# Patient Record
Sex: FEMALE | Race: ASIAN | NOT HISPANIC OR LATINO | Employment: STUDENT | URBAN - METROPOLITAN AREA
[De-identification: names, ages, dates, MRNs, and addresses within clinical notes are randomized per-mention and may not be internally consistent; named-entity substitution may affect disease eponyms.]

---

## 2017-03-03 ENCOUNTER — OFFICE VISIT (OUTPATIENT)
Dept: URGENT CARE | Facility: CLINIC | Age: 22
End: 2017-03-03
Payer: OTHER GOVERNMENT

## 2017-03-03 VITALS
WEIGHT: 190 LBS | DIASTOLIC BLOOD PRESSURE: 74 MMHG | SYSTOLIC BLOOD PRESSURE: 122 MMHG | HEIGHT: 68 IN | HEART RATE: 88 BPM | BODY MASS INDEX: 28.79 KG/M2 | TEMPERATURE: 99.9 F | RESPIRATION RATE: 16 BRPM | OXYGEN SATURATION: 94 %

## 2017-03-03 DIAGNOSIS — J06.9 ACUTE URI: ICD-10-CM

## 2017-03-03 DIAGNOSIS — J45.21 MILD INTERMITTENT ASTHMA WITH ACUTE EXACERBATION: ICD-10-CM

## 2017-03-03 PROCEDURE — 99214 OFFICE O/P EST MOD 30 MIN: CPT | Performed by: NURSE PRACTITIONER

## 2017-03-03 RX ORDER — AZITHROMYCIN 250 MG/1
TABLET, FILM COATED ORAL
Qty: 6 TAB | Refills: 0 | Status: SHIPPED | OUTPATIENT
Start: 2017-03-03 | End: 2017-09-28

## 2017-03-03 RX ORDER — PREDNISONE 20 MG/1
40 TABLET ORAL DAILY
Qty: 8 TAB | Refills: 0 | Status: SHIPPED | OUTPATIENT
Start: 2017-03-03 | End: 2017-09-08

## 2017-03-03 RX ORDER — CODEINE PHOSPHATE AND GUAIFENESIN 10; 100 MG/5ML; MG/5ML
5-10 SOLUTION ORAL EVERY 6 HOURS PRN
Qty: 120 ML | Refills: 0 | Status: ON HOLD | OUTPATIENT
Start: 2017-03-03 | End: 2017-09-28

## 2017-03-03 ASSESSMENT — ENCOUNTER SYMPTOMS
CHILLS: 0
SPUTUM PRODUCTION: 0
SHORTNESS OF BREATH: 0
WEAKNESS: 1
RHINORRHEA: 1
SORE THROAT: 1
WHEEZING: 0
COUGH: 1
NAUSEA: 0
FEVER: 0
MYALGIAS: 0
VOMITING: 0
DIARRHEA: 0

## 2017-03-03 NOTE — MR AVS SNAPSHOT
"        Nia Caballero   3/3/2017 3:45 PM   Office Visit   MRN: [id number]    Department:  [facility]   Dept Phone:  [phone]    Description:  Female : 1995   Provider:  CLAUDIA Bae           Reason for Visit     Cough 6 days      Allergies as of 3/3/2017     Allergen Noted Reactions    Naproxen 2014         You were diagnosed with     Acute URI   [641753]       Mild intermittent asthma with acute exacerbation   [697733]         Vital Signs     Blood Pressure Pulse Temperature Respirations Height Weight    122/74 mmHg 88 37.7 °C (99.9 °F) 16 1.734 m (5' 8.27\") 86.183 kg (190 lb)    Body Mass Index Oxygen Saturation Smoking Status             28.66 kg/m2 94% Never Smoker          Basic Information     Date Of Birth Sex Race Ethnicity Preferred Language    1995 Female White Non- English      Problem List              ICD-10-CM Priority Class Noted - Resolved    Asthma J45.909   2015 - Present    Asthma exacerbation J45.901   2015 - Present      Health Maintenance        Date Due Completion Dates    IMM HEP B VACCINE (1 of 3 - Primary Series) 1995 ---    IMM HEP A VACCINE (1 of 2 - Standard Series) 1/3/1996 ---    IMM VARICELLA (CHICKENPOX) VACCINE (1 of 2 - 2 Dose Adolescent Series) 1/3/2008 ---    IMM PNEUMOCOCCAL 19-64 (ADULT) MEDIUM RISK SERIES (1 of 1 - PPSV23) 1/3/2014 ---    IMM HPV VACCINE (2 of 3 - Female 3 Dose Series) 3/26/2015 2015    PAP SMEAR 1/3/2016 ---    IMM INFLUENZA (1) 2016 ---    IMM DTaP/Tdap/Td Vaccine (2 - Td) 2025            Current Immunizations     HPV Quadrivalent Vaccine (GARDASIL) 2015    Tdap Vaccine 2015      Below and/or attached are the medications your provider expects you to take. Review all of your home medications and newly ordered medications with your provider and/or pharmacist. Follow medication instructions as directed by your provider and/or pharmacist. Please keep your " medication list with you and share with your provider. Update the information when medications are discontinued, doses are changed, or new medications (including over-the-counter products) are added; and carry medication information at all times in the event of emergency situations     Allergies:  NAPROXEN - (reactions not documented)               Medications  Valid as of: March 03, 2017 -  4:53 PM    Generic Name Brand Name Tablet Size Instructions for use    albuterol (PROVENTIL) 2.5 mg/0.5 mL NEBU (Nebu Soln) PROVENTIL 2.5 mg/0.5 mL 10 mg by Nebulization route 4 times a day as needed.        Albuterol Sulfate (Aero Soln) albuterol 108 (90 BASE) MCG/ACT Inhale 2 Puffs by mouth every 6 hours as needed for Shortness of Breath.        Albuterol Sulfate (Aero Soln) VENTOLIN  (90 BASE) MCG/ACT INHALE TWO PUFFS BY MOUTH EVERY 6 HOURS AS NEEDED FOR FOR SHORTNESS OF BREATH        Azithromycin (Tab) ZITHROMAX 250 MG Take as directed        Cetirizine HCl (Tab) ZYRTEC 10 MG Take 1 Tab by mouth every day.        Fluticasone Propionate (Suspension) FLONASE 50 MCG/ACT Spray 1 Spray in nose every day.        Fluticasone Propionate HFA (Aerosol) FLOVENT  MCG/ACT Inhale 2 Puffs by mouth every 12 hours.        Fluticasone-Salmeterol (AEROSOL POWDER, BREATH ACTIVATED) ADVAIR 500-50 MCG/DOSE Inhale 1 Puff by mouth every 12 hours.        Guaifenesin-Codeine (Solution) ROBITUSSIN -10 mg/5mL Take 5-10 mL by mouth every 6 hours as needed for Cough.        Montelukast Sodium (Tab) SINGULAIR 10 MG Take 1 Tab by mouth every day.        PredniSONE (Tab) DELTASONE 20 MG Take 2 Tabs by mouth every day.        .                 Medicines prescribed today were sent to:     [facility][id number] - [place] - [address] [place] [address]    Phone: [phone] Fax: [phone]    Open 24 Hours?: No    [id number] - [place] - [address] AT [facility]    [address]  [place] [address]    Phone: [phone] Fax: [phone]    Open 24 Hours?: Yes      Medication refill instructions:       If your prescription bottle indicates you have medication refills left, it is not necessary to call your provider’s office. Please contact your pharmacy and they will refill your medication.    If your prescription bottle indicates you do not have any refills left, you may request refills at any time through one of the following ways: The online Belly system (except Urgent Care), by calling your provider’s office, or by asking your pharmacy to contact your provider’s office with a refill request. Medication refills are processed only during regular business hours and may not be available until the next business day. Your provider may request additional information or to have a follow-up visit with you prior to refilling your medication.   *Please Note: Medication refills are assigned a new Rx number when refilled electronically. Your pharmacy may indicate that no refills were authorized even though a new prescription for the same medication is available at the pharmacy. Please request the medicine by name with the pharmacy before contacting your provider for a refill.           Belly Access Code: Activation code not generated  Current Belly Status: Active

## 2017-03-03 NOTE — PROGRESS NOTES
"Subjective:      Nia Caballero is a 22 y.o. female who presents with Cough            HPI Comments: Medications, Allergies and Prior Medical Hx reviewed and updated in [facility].with patient/family today         Cough  This is a new problem. The current episode started in the past 7 days. The problem has been gradually worsening. The cough is productive of sputum. Associated symptoms include nasal congestion, rhinorrhea and a sore throat. Pertinent negatives include no chills, ear congestion, ear pain, fever, myalgias, shortness of breath or wheezing. Nothing aggravates the symptoms. She has tried a beta-agonist inhaler for the symptoms. The treatment provided moderate relief. Her past medical history is significant for asthma.       Review of Systems   Constitutional: Positive for malaise/fatigue. Negative for fever and chills.   HENT: Positive for congestion, rhinorrhea and sore throat. Negative for ear discharge and ear pain.    Respiratory: Positive for cough. Negative for sputum production, shortness of breath and wheezing.    Gastrointestinal: Negative for nausea, vomiting and diarrhea.   Musculoskeletal: Negative for myalgias.   Neurological: Positive for weakness.          Objective:     /74 mmHg  Pulse 88  Temp(Src) 37.7 °C (99.9 °F)  Resp 16  Ht 1.734 m (5' 8.27\")  Wt 86.183 kg (190 lb)  BMI 28.66 kg/m2  SpO2 94%     Physical Exam   Constitutional: She appears well-developed and well-nourished. No distress.   HENT:   Head: Normocephalic and atraumatic.   Right Ear: Tympanic membrane and ear canal normal.   Left Ear: Tympanic membrane and ear canal normal.   Nose: Rhinorrhea present.   Mouth/Throat: Uvula is midline and mucous membranes are normal. Posterior oropharyngeal edema and posterior oropharyngeal erythema present. No oropharyngeal exudate.   Eyes: Conjunctivae are normal. Pupils are equal, round, and reactive to light.   Neck: Neck supple.   Cardiovascular: Normal rate, regular rhythm " and normal heart sounds.    Pulmonary/Chest: Effort normal and breath sounds normal. No respiratory distress. She has no wheezes.   Lymphadenopathy:     She has cervical adenopathy.   Neurological: She is alert.   Skin: Skin is warm and dry.   Psychiatric: She has a normal mood and affect. Her behavior is normal.   Vitals reviewed.              Assessment/Plan:       1. Acute URI  azithromycin (ZITHROMAX) 250 MG Tab    predniSONE (DELTASONE) 20 MG Tab    guaifenesin-codeine (CHERATUSSIN AC) Solution oral solution   2. Mild intermittent asthma with acute exacerbation  azithromycin (ZITHROMAX) 250 MG Tab    predniSONE (DELTASONE) 20 MG Tab    guaifenesin-codeine (CHERATUSSIN AC) Solution oral solution       Do not drink alcohol or operate machinery with this medication  Pt reviewed on [place]  Aware,  no remarkable controlled substance prescription documentation noted    Rest, Fluids, tylenol, ibuprofen, humidified air, and otc cough meds  Pt will go to the ER for worsening or changing symptoms as discussed, follow-up with your primary care provider or return here if not improving in 7 days   Discharge instructions discussed with pt/family who verbalize understanding and agreement with poc

## 2017-05-13 ENCOUNTER — OFFICE VISIT (OUTPATIENT)
Dept: URGENT CARE | Facility: CLINIC | Age: 22
End: 2017-05-13
Payer: OTHER GOVERNMENT

## 2017-05-13 VITALS
BODY MASS INDEX: 31.23 KG/M2 | DIASTOLIC BLOOD PRESSURE: 76 MMHG | RESPIRATION RATE: 20 BRPM | OXYGEN SATURATION: 93 % | TEMPERATURE: 97.7 F | WEIGHT: 207 LBS | SYSTOLIC BLOOD PRESSURE: 110 MMHG | HEART RATE: 93 BPM

## 2017-05-13 DIAGNOSIS — Z91.09 ENVIRONMENTAL ALLERGIES: ICD-10-CM

## 2017-05-13 DIAGNOSIS — R05.9 COUGH: ICD-10-CM

## 2017-05-13 PROCEDURE — 99214 OFFICE O/P EST MOD 30 MIN: CPT | Mod: 25 | Performed by: PHYSICIAN ASSISTANT

## 2017-05-13 RX ORDER — AZITHROMYCIN 250 MG/1
TABLET, FILM COATED ORAL
Qty: 6 TAB | Refills: 1 | Status: SHIPPED | OUTPATIENT
Start: 2017-05-13 | End: 2017-09-28

## 2017-05-13 RX ORDER — TRIAMCINOLONE ACETONIDE 40 MG/ML
40 INJECTION, SUSPENSION INTRA-ARTICULAR; INTRAMUSCULAR ONCE
Status: COMPLETED | OUTPATIENT
Start: 2017-05-13 | End: 2017-05-13

## 2017-05-13 RX ORDER — METHYLPREDNISOLONE 4 MG/1
TABLET ORAL
Qty: 1 TAB | Refills: 0 | Status: SHIPPED | OUTPATIENT
Start: 2017-05-13 | End: 2017-09-08

## 2017-05-13 RX ADMIN — TRIAMCINOLONE ACETONIDE 40 MG: 40 INJECTION, SUSPENSION INTRA-ARTICULAR; INTRAMUSCULAR at 13:48

## 2017-05-13 ASSESSMENT — ENCOUNTER SYMPTOMS
SORE THROAT: 0
EYES NEGATIVE: 1
FEVER: 0
CONSTITUTIONAL NEGATIVE: 1
SHORTNESS OF BREATH: 0
SPUTUM PRODUCTION: 0
COUGH: 1

## 2017-05-13 NOTE — MR AVS SNAPSHOT
Nia Caballero   2017 1:15 PM   Office Visit   MRN: [id number]    Department:  [facility]   Dept Phone:  [phone]    Description:  Female : 1995   Provider:  Pierre Pradhan PA-C           Reason for Visit     Allergic Rhinitis x1 wk, stuffy nose, itchy and watery eyes    Cough x 1 wk, dry nonproductive cough, wheezing and shortness of breath      Allergies as of 2017     Allergen Noted Reactions    Naproxen 2014         You were diagnosed with     Environmental allergies   [256943]       Cough   [786.2.ICD-9-CM]         Vital Signs     Blood Pressure Pulse Temperature Respirations Weight Oxygen Saturation    110/76 mmHg 93 36.5 °C (97.7 °F) 20 93.895 kg (207 lb) 93%    Smoking Status                   Never Smoker            Basic Information     Date Of Birth Sex Race Ethnicity Preferred Language    1995 Female White Non- English      Problem List              ICD-10-CM Priority Class Noted - Resolved    Asthma J45.909   2015 - Present    Asthma exacerbation J45.901   2015 - Present      Health Maintenance        Date Due Completion Dates    IMM HEP B VACCINE (1 of 3 - Primary Series) 1995 ---    IMM HEP A VACCINE (1 of 2 - Standard Series) 1/3/1996 ---    IMM VARICELLA (CHICKENPOX) VACCINE (1 of 2 - 2 Dose Adolescent Series) 1/3/2008 ---    IMM PNEUMOCOCCAL 19-64 (ADULT) MEDIUM RISK SERIES (1 of 1 - PPSV23) 1/3/2014 ---    IMM HPV VACCINE (2 of 3 - Female 3 Dose Series) 3/26/2015 2015    PAP SMEAR 1/3/2016 ---    IMM DTaP/Tdap/Td Vaccine (2 - Td) 2025            Current Immunizations     HPV Quadrivalent Vaccine (GARDASIL) 2015    Tdap Vaccine 2015      Below and/or attached are the medications your provider expects you to take. Review all of your home medications and newly ordered medications with your provider and/or pharmacist. Follow medication instructions as directed by your provider and/or pharmacist. Please  keep your medication list with you and share with your provider. Update the information when medications are discontinued, doses are changed, or new medications (including over-the-counter products) are added; and carry medication information at all times in the event of emergency situations     Allergies:  NAPROXEN - (reactions not documented)               Medications  Valid as of: May 13, 2017 -  2:04 PM    Generic Name Brand Name Tablet Size Instructions for use    albuterol (PROVENTIL) 2.5 mg/0.5 mL NEBU (Nebu Soln) PROVENTIL 2.5 mg/0.5 mL 10 mg by Nebulization route 4 times a day as needed.        Albuterol Sulfate (Aero Soln) albuterol 108 (90 BASE) MCG/ACT Inhale 2 Puffs by mouth every 6 hours as needed for Shortness of Breath.        Albuterol Sulfate (Aero Soln) VENTOLIN  (90 BASE) MCG/ACT INHALE TWO PUFFS BY MOUTH EVERY 6 HOURS AS NEEDED FOR FOR SHORTNESS OF BREATH        Azithromycin (Tab) ZITHROMAX 250 MG Take as directed        Azithromycin (Tab) ZITHROMAX 250 MG z-emily; U.D.        Cetirizine HCl (Tab) ZYRTEC 10 MG Take 1 Tab by mouth every day.        Fluticasone Propionate (Suspension) FLONASE 50 MCG/ACT Spray 1 Spray in nose every day.        Fluticasone Propionate HFA (Aerosol) FLOVENT  MCG/ACT Inhale 2 Puffs by mouth every 12 hours.        Fluticasone-Salmeterol (AEROSOL POWDER, BREATH ACTIVATED) ADVAIR 500-50 MCG/DOSE Inhale 1 Puff by mouth every 12 hours.        Guaifenesin-Codeine (Solution) ROBITUSSIN -10 mg/5mL Take 5-10 mL by mouth every 6 hours as needed for Cough.        MethylPREDNISolone (Tablet Therapy Pack) MEDROL DOSEPAK 4 MG U.D.        Montelukast Sodium (Tab) SINGULAIR 10 MG Take 1 Tab by mouth every day.        PredniSONE (Tab) DELTASONE 20 MG Take 2 Tabs by mouth every day.        .                 Medicines prescribed today were sent to:     [id number] [place] - [address] AT [facility]    [address]  [place] [address]    Phone: [phone] Fax: [phone]    Open 24 Hours?: No      Medication refill instructions:       If your prescription bottle indicates you have medication refills left, it is not necessary to call your provider’s office. Please contact your pharmacy and they will refill your medication.    If your prescription bottle indicates you do not have any refills left, you may request refills at any time through one of the following ways: The online PerBlue system (except Urgent Care), by calling your provider’s office, or by asking your pharmacy to contact your provider’s office with a refill request. Medication refills are processed only during regular business hours and may not be available until the next business day. Your provider may request additional information or to have a follow-up visit with you prior to refilling your medication.   *Please Note: Medication refills are assigned a new Rx number when refilled electronically. Your pharmacy may indicate that no refills were authorized even though a new prescription for the same medication is available at the pharmacy. Please request the medicine by name with the pharmacy before contacting your provider for a refill.           PerBlue Access Code: Activation code not generated  Current PerBlue Status: Active

## 2017-05-13 NOTE — PROGRESS NOTES
Subjective:      Nia Caballero is a 22 y.o. female who presents with Allergic Rhinitis and Cough            Cough  This is a new (allerg.sx, cough) problem. The current episode started in the past 7 days. The problem has been unchanged. The problem occurs every few minutes. The cough is non-productive. Associated symptoms include nasal congestion. Pertinent negatives include no fever, sore throat or shortness of breath. Nothing aggravates the symptoms. She has tried nothing for the symptoms. The treatment provided no relief. There is no history of asthma or environmental allergies.       Review of Systems   Constitutional: Negative.  Negative for fever.   HENT: Positive for congestion. Negative for sore throat.    Eyes: Negative.    Respiratory: Positive for cough. Negative for sputum production and shortness of breath.    Skin: Negative.    Endo/Heme/Allergies: Negative for environmental allergies.          Objective:     /76 mmHg  Pulse 93  Temp(Src) 36.5 °C (97.7 °F)  Resp 20  Wt 93.895 kg (207 lb)  SpO2 93%     Physical Exam   Constitutional: She is oriented to person, place, and time. She appears well-developed and well-nourished. No distress.   HENT:   Head: Normocephalic and atraumatic.   Eyes: EOM are normal. Pupils are equal, round, and reactive to light.   Neck: Normal range of motion. Neck supple.   Cardiovascular: Normal rate.    Pulmonary/Chest: Effort normal and breath sounds normal. No respiratory distress. She has no wheezes. She has no rales.   Lymphadenopathy:     She has no cervical adenopathy.   Neurological: She is alert and oriented to person, place, and time.   Skin: Skin is warm and dry.   Psychiatric: She has a normal mood and affect. Her behavior is normal. Judgment and thought content normal.   Nursing note and vitals reviewed.    Filed Vitals:    05/13/17 1326   BP: 110/76   Pulse: 93   Temp: 36.5 °C (97.7 °F)   Resp: 20   Weight: 93.895 kg (207 lb)   SpO2: 93%     Active  Ambulatory Problems     Diagnosis Date Noted   • Asthma 01/29/2015   • Asthma exacerbation 04/02/2015     Resolved Ambulatory Problems     Diagnosis Date Noted   • No Resolved Ambulatory Problems     Past Medical History   Diagnosis Date   • Unspecified asthma    • Seasonal allergies      Current Outpatient Prescriptions on File Prior to Visit   Medication Sig Dispense Refill   • montelukast (SINGULAIR) 10 MG TABS Take 1 Tab by mouth every day. 30 Tab 1   • fluticasone-salmeterol (ADVAIR) 500-50 MCG/DOSE AEPB Inhale 1 Puff by mouth every 12 hours. 1 Inhaler 3   • fluticasone (FLONASE) 50 MCG/ACT nasal spray Spray 1 Spray in nose every day.     • fluticasone (FLOVENT HFA) 110 MCG/ACT AERO Inhale 2 Puffs by mouth every 12 hours. 2 Inhaler 1   • albuterol (PROVENTIL) 2.5 mg/0.5 mL NEBU 10 mg by Nebulization route 4 times a day as needed. 3 Bottle 3   • azithromycin (ZITHROMAX) 250 MG Tab Take as directed 6 Tab 0   • predniSONE (DELTASONE) 20 MG Tab Take 2 Tabs by mouth every day. 8 Tab 0   • guaifenesin-codeine (CHERATUSSIN AC) Solution oral solution Take 5-10 mL by mouth every 6 hours as needed for Cough. 120 mL 0   • VENTOLIN  (90 BASE) MCG/ACT Aero Soln inhalation aerosol INHALE TWO PUFFS BY MOUTH EVERY 6 HOURS AS NEEDED FOR FOR SHORTNESS OF BREATH 1 Inhaler 0   • cetirizine (ZYRTEC) 10 MG TABS Take 1 Tab by mouth every day. 30 Tab 0   • albuterol (VENTOLIN OR PROVENTIL) 108 (90 BASE) MCG/ACT AERS inhalation aerosol Inhale 2 Puffs by mouth every 6 hours as needed for Shortness of Breath. 8.5 g 3     No current facility-administered medications on file prior to visit.     Gargles, Cepacol lozenges, Aleve/Advil as needed for throat pain  Family History   Problem Relation Age of Onset   • Alcohol/Drug Neg Hx    • Allergies Neg Hx    • Anesthesia Neg Hx    • Cancer Maternal Grandmother    • Diabetes Mother    • Diabetes Maternal Grandmother    • Heart Disease Neg Hx    • Hypertension Mother    • Hypertension  Father    • Hyperlipidemia Father    • Psychiatry Neg Hx    • Stroke Neg Hx    • Thyroid Neg Hx      Naproxen              Assessment/Plan:     ·  allergies; cough      · Kenalog 40mgIM; [add medrolDP prn or zpak if cough/sx persist]

## 2017-07-15 ENCOUNTER — OFFICE VISIT (OUTPATIENT)
Dept: URGENT CARE | Facility: CLINIC | Age: 22
End: 2017-07-15
Payer: OTHER GOVERNMENT

## 2017-07-15 VITALS
HEART RATE: 83 BPM | DIASTOLIC BLOOD PRESSURE: 72 MMHG | HEIGHT: 68 IN | BODY MASS INDEX: 31.07 KG/M2 | OXYGEN SATURATION: 95 % | TEMPERATURE: 98.4 F | SYSTOLIC BLOOD PRESSURE: 108 MMHG | WEIGHT: 205 LBS

## 2017-07-15 DIAGNOSIS — H65.113 ACUTE MUCOID OTITIS MEDIA OF BOTH EARS: ICD-10-CM

## 2017-07-15 PROCEDURE — 99213 OFFICE O/P EST LOW 20 MIN: CPT | Performed by: PHYSICIAN ASSISTANT

## 2017-07-15 RX ORDER — CEFUROXIME AXETIL 500 MG/1
500 TABLET ORAL 2 TIMES DAILY
Qty: 20 TAB | Refills: 0 | Status: SHIPPED | OUTPATIENT
Start: 2017-07-15 | End: 2017-07-25

## 2017-07-15 RX ORDER — PREDNISONE 20 MG/1
40 TABLET ORAL DAILY
Qty: 6 TAB | Refills: 0 | Status: SHIPPED | OUTPATIENT
Start: 2017-07-15 | End: 2017-07-18

## 2017-07-15 ASSESSMENT — ENCOUNTER SYMPTOMS
HEADACHES: 0
CONSTITUTIONAL NEGATIVE: 1
SORE THROAT: 0
NEUROLOGICAL NEGATIVE: 1

## 2017-07-15 NOTE — PROGRESS NOTES
"Subjective:      Nia Caballero is a 22 y.o. female who presents with Otalgia            Otalgia   There is pain in both ears. This is a new (ears plugged, pain) problem. The current episode started in the past 7 days. The problem occurs constantly. The problem has been unchanged. There has been no fever. The pain is mild. Associated symptoms include hearing loss. Pertinent negatives include no ear discharge, headaches or sore throat. She has tried nothing for the symptoms. The treatment provided no relief. There is no history of a chronic ear infection, hearing loss or a tympanostomy tube.       Review of Systems   Constitutional: Negative.    HENT: Positive for ear pain and hearing loss. Negative for ear discharge, sore throat and tinnitus.    Skin: Negative.    Neurological: Negative.  Negative for headaches.          Objective:     /72 mmHg  Pulse 83  Temp(Src) 36.9 °C (98.4 °F)  Ht 1.727 m (5' 7.99\")  Wt 92.987 kg (205 lb)  BMI 31.18 kg/m2  SpO2 95%     Physical Exam   Constitutional: She is oriented to person, place, and time. She appears well-developed and well-nourished. No distress.   HENT:   Head: Normocephalic and atraumatic.   +TM redn bilat     Neck: Normal range of motion. Neck supple.   Neurological: She is alert and oriented to person, place, and time.   Skin: Skin is warm and dry.   Psychiatric: She has a normal mood and affect. Her behavior is normal. Judgment and thought content normal.   Nursing note and vitals reviewed.    Filed Vitals:    07/15/17 1608   BP: 108/72   Pulse: 83   Temp: 36.9 °C (98.4 °F)   Height: 1.727 m (5' 7.99\")   Weight: 92.987 kg (205 lb)   SpO2: 95%     Active Ambulatory Problems     Diagnosis Date Noted   • Asthma 01/29/2015   • Asthma exacerbation 04/02/2015     Resolved Ambulatory Problems     Diagnosis Date Noted   • No Resolved Ambulatory Problems     Past Medical History   Diagnosis Date   • Unspecified asthma    • Seasonal allergies      Current " Outpatient Prescriptions on File Prior to Visit   Medication Sig Dispense Refill   • VENTOLIN  (90 BASE) MCG/ACT Aero Soln inhalation aerosol INHALE TWO PUFFS BY MOUTH EVERY 6 HOURS AS NEEDED FOR FOR SHORTNESS OF BREATH 1 Inhaler 0   • montelukast (SINGULAIR) 10 MG TABS Take 1 Tab by mouth every day. 30 Tab 1   • fluticasone (FLONASE) 50 MCG/ACT nasal spray Spray 1 Spray in nose every day.     • cetirizine (ZYRTEC) 10 MG TABS Take 1 Tab by mouth every day. 30 Tab 0   • albuterol (PROVENTIL) 2.5 mg/0.5 mL NEBU 10 mg by Nebulization route 4 times a day as needed. 3 Bottle 3   • MethylPREDNISolone (MEDROL DOSEPAK) 4 MG Tablet Therapy Pack U.D. 1 Tab 0   • azithromycin (ZITHROMAX) 250 MG Tab z-emily; U.D. 6 Tab 1   • azithromycin (ZITHROMAX) 250 MG Tab Take as directed 6 Tab 0   • predniSONE (DELTASONE) 20 MG Tab Take 2 Tabs by mouth every day. 8 Tab 0   • guaifenesin-codeine (CHERATUSSIN AC) Solution oral solution Take 5-10 mL by mouth every 6 hours as needed for Cough. 120 mL 0   • fluticasone-salmeterol (ADVAIR) 500-50 MCG/DOSE AEPB Inhale 1 Puff by mouth every 12 hours. 1 Inhaler 3   • fluticasone (FLOVENT HFA) 110 MCG/ACT AERO Inhale 2 Puffs by mouth every 12 hours. 2 Inhaler 1   • albuterol (VENTOLIN OR PROVENTIL) 108 (90 BASE) MCG/ACT AERS inhalation aerosol Inhale 2 Puffs by mouth every 6 hours as needed for Shortness of Breath. 8.5 g 3     No current facility-administered medications on file prior to visit.     Gargles, Cepacol lozenges, Aleve/Advil as needed for throat pain  Family History   Problem Relation Age of Onset   • Alcohol/Drug Neg Hx    • Allergies Neg Hx    • Anesthesia Neg Hx    • Cancer Maternal Grandmother    • Diabetes Mother    • Diabetes Maternal Grandmother    • Heart Disease Neg Hx    • Hypertension Mother    • Hypertension Father    • Hyperlipidemia Father    • Psychiatry Neg Hx    • Stroke Neg Hx    • Thyroid Neg Hx      Naproxen              Assessment/Plan:     ·  wax  obstr      · meds as rx; cortisone rx [hold prn if sx persist] [pt cant take nsaids]

## 2017-07-15 NOTE — MR AVS SNAPSHOT
"        Nia Caballero   7/15/2017 4:00 PM   Office Visit   MRN: [id number]    Department:  [facility]   Dept Phone:  [phone]    Description:  Female : 1995   Provider:  Pierre Pradhan PA-C           Reason for Visit     Otalgia X today, ears hurt, hard to hear, feels clogged       Allergies as of 7/15/2017     Allergen Noted Reactions    Naproxen 2014         You were diagnosed with     Acute mucoid otitis media of both ears   [[id number]]         Vital Signs     Blood Pressure Pulse Temperature Height Weight Body Mass Index    108/72 mmHg 83 36.9 °C (98.4 °F) 1.727 m (5' 7.99\") 92.987 kg (205 lb) 31.18 kg/m2    Oxygen Saturation Smoking Status                95% Never Smoker           Basic Information     Date Of Birth Sex Race Ethnicity Preferred Language    1995 Female White Non- English      Problem List              ICD-10-CM Priority Class Noted - Resolved    Asthma J45.909   2015 - Present    Asthma exacerbation J45.901   2015 - Present      Health Maintenance        Date Due Completion Dates    IMM HEP B VACCINE (1 of 3 - Primary Series) 1995 ---    IMM HEP A VACCINE (1 of 2 - Standard Series) 1/3/1996 ---    IMM VARICELLA (CHICKENPOX) VACCINE (1 of 2 - 2 Dose Adolescent Series) 1/3/2008 ---    IMM PNEUMOCOCCAL 19-64 (ADULT) MEDIUM RISK SERIES (1 of 1 - PPSV23) 1/3/2014 ---    IMM HPV VACCINE (2 of 3 - Female 3 Dose Series) 3/26/2015 2015    PAP SMEAR 1/3/2016 ---    IMM INFLUENZA (1) 2017 ---    IMM DTaP/Tdap/Td Vaccine (2 - Td) 2025            Current Immunizations     HPV Quadrivalent Vaccine (GARDASIL) 2015    Tdap Vaccine 2015      Below and/or attached are the medications your provider expects you to take. Review all of your home medications and newly ordered medications with your provider and/or pharmacist. Follow medication instructions as directed by your provider and/or pharmacist. Please keep your medication " list with you and share with your provider. Update the information when medications are discontinued, doses are changed, or new medications (including over-the-counter products) are added; and carry medication information at all times in the event of emergency situations     Allergies:  NAPROXEN - (reactions not documented)               Medications  Valid as of: July 15, 2017 -  4:31 PM    Generic Name Brand Name Tablet Size Instructions for use    albuterol (PROVENTIL) 2.5 mg/0.5 mL NEBU (Nebu Soln) PROVENTIL 2.5 mg/0.5 mL 10 mg by Nebulization route 4 times a day as needed.        Albuterol Sulfate (Aero Soln) albuterol 108 (90 BASE) MCG/ACT Inhale 2 Puffs by mouth every 6 hours as needed for Shortness of Breath.        Albuterol Sulfate (Aero Soln) VENTOLIN  (90 BASE) MCG/ACT INHALE TWO PUFFS BY MOUTH EVERY 6 HOURS AS NEEDED FOR FOR SHORTNESS OF BREATH        Azithromycin (Tab) ZITHROMAX 250 MG Take as directed        Azithromycin (Tab) ZITHROMAX 250 MG z-emliy; U.D.        Cefuroxime Axetil (Tab) CEFTIN 500 MG Take 1 Tab by mouth 2 times a day for 10 days.        Cetirizine HCl (Tab) ZYRTEC 10 MG Take 1 Tab by mouth every day.        Fluticasone Propionate (Suspension) FLONASE 50 MCG/ACT Spray 1 Spray in nose every day.        Fluticasone Propionate HFA (Aerosol) FLOVENT  MCG/ACT Inhale 2 Puffs by mouth every 12 hours.        Fluticasone-Salmeterol (AEROSOL POWDER, BREATH ACTIVATED) ADVAIR 500-50 MCG/DOSE Inhale 1 Puff by mouth every 12 hours.        Guaifenesin-Codeine (Solution) ROBITUSSIN -10 mg/5mL Take 5-10 mL by mouth every 6 hours as needed for Cough.        MethylPREDNISolone (Tablet Therapy Pack) MEDROL DOSEPAK 4 MG U.D.        Montelukast Sodium (Tab) SINGULAIR 10 MG Take 1 Tab by mouth every day.        PredniSONE (Tab) DELTASONE 20 MG Take 2 Tabs by mouth every day.        PredniSONE (Tab) DELTASONE 20 MG Take 2 Tabs by mouth every day for 3 days.        .                 Medicines  prescribed today were sent to:     [id number] - [place], [place] - [address] AT [facility] [address]    [address] [place] [address]    Phone: [phone] Fax: [phone]    Open 24 Hours?: No      Medication refill instructions:       If your prescription bottle indicates you have medication refills left, it is not necessary to call your provider’s office. Please contact your pharmacy and they will refill your medication.    If your prescription bottle indicates you do not have any refills left, you may request refills at any time through one of the following ways: The online [employer] system (except Urgent Care), by calling your provider’s office, or by asking your pharmacy to contact your provider’s office with a refill request. Medication refills are processed only during regular business hours and may not be available until the next business day. Your provider may request additional information or to have a follow-up visit with you prior to refilling your medication.   *Please Note: Medication refills are assigned a new Rx number when refilled electronically. Your pharmacy may indicate that no refills were authorized even though a new prescription for the same medication is available at the pharmacy. Please request the medicine by name with the pharmacy before contacting your provider for a refill.           [employer] Access Code: Activation code not generated  Current [employer] Status: Active

## 2017-09-08 ENCOUNTER — OFFICE VISIT (OUTPATIENT)
Dept: URGENT CARE | Facility: CLINIC | Age: 22
End: 2017-09-08
Payer: OTHER GOVERNMENT

## 2017-09-08 VITALS
BODY MASS INDEX: 32.71 KG/M2 | RESPIRATION RATE: 18 BRPM | TEMPERATURE: 97.8 F | DIASTOLIC BLOOD PRESSURE: 76 MMHG | HEART RATE: 88 BPM | HEIGHT: 68 IN | SYSTOLIC BLOOD PRESSURE: 120 MMHG | OXYGEN SATURATION: 94 % | WEIGHT: 215.8 LBS

## 2017-09-08 DIAGNOSIS — J45.20 MILD INTERMITTENT ASTHMA WITHOUT COMPLICATION: ICD-10-CM

## 2017-09-08 PROCEDURE — 99214 OFFICE O/P EST MOD 30 MIN: CPT | Performed by: PHYSICIAN ASSISTANT

## 2017-09-08 RX ORDER — PREDNISONE 20 MG/1
20 TABLET ORAL DAILY
Qty: 5 TAB | Refills: 0 | Status: SHIPPED | OUTPATIENT
Start: 2017-09-08 | End: 2017-09-13

## 2017-09-08 ASSESSMENT — ENCOUNTER SYMPTOMS
MUSCULOSKELETAL NEGATIVE: 1
FEVER: 0
ABDOMINAL PAIN: 0
HEADACHES: 0
WHEEZING: 1
SHORTNESS OF BREATH: 0
VOMITING: 0
CHILLS: 0
DIZZINESS: 0
NAUSEA: 0
DIARRHEA: 0
SPUTUM PRODUCTION: 0
COUGH: 1

## 2017-09-08 ASSESSMENT — COPD QUESTIONNAIRES: COPD: 0

## 2017-09-08 NOTE — PROGRESS NOTES
"Subjective:      Nia Caballero is a 22 y.o. female who presents with Asthma (x3days, asthma acting up at night only, unable to sleep, SOB)            Asthma   She complains of cough and wheezing. There is no shortness of breath or sputum production. This is a recurrent problem. The current episode started in the past 7 days (3 days). Episode frequency: nightly. The problem has been unchanged. The cough is dry. Pertinent negatives include no chest pain, fever or headaches. Her symptoms are aggravated by lying down. Her symptoms are alleviated by change in position and beta-agonist. She reports minimal improvement on treatment. There are no known risk factors for lung disease. Her past medical history is significant for asthma. There is no history of bronchitis, COPD or pneumonia.     Patient presents to urgent care reporting 3 day history of \"asthma flare\". She states she often has this problem when the seasons are changing. She feels fine throughout the day, but as soon as she lay down she starts wheezing and experiencing a dry cough, which is interfering with her sleep. She denies productive cough, fevers, chills, chest pain, SOB, or history of pneumonia.    Review of Systems   Constitutional: Negative for chills and fever.   Respiratory: Positive for cough and wheezing. Negative for sputum production and shortness of breath.    Cardiovascular: Negative for chest pain.   Gastrointestinal: Negative for abdominal pain, diarrhea, nausea and vomiting.   Genitourinary: Negative.    Musculoskeletal: Negative.    Neurological: Negative for dizziness and headaches.          Objective:     /76   Pulse 88   Temp 36.6 °C (97.8 °F)   Resp 18   Ht 1.727 m (5' 8\")   Wt 97.9 kg (215 lb 12.8 oz)   SpO2 94%   BMI 32.81 kg/m²      Physical Exam   Constitutional: She is oriented to person, place, and time. She appears well-developed and well-nourished. No distress.   HENT:   Head: Normocephalic and atraumatic. "   Eyes: Conjunctivae are normal. Pupils are equal, round, and reactive to light.   Neck: Normal range of motion.   Cardiovascular: Normal rate, regular rhythm and normal heart sounds.    No murmur heard.  Pulmonary/Chest: Effort normal and breath sounds normal. No respiratory distress. She has no wheezes. She has no rales.   Mild expiratory wheezes heard throughout lung fields   Musculoskeletal: Normal range of motion.   Neurological: She is alert and oriented to person, place, and time.   Skin: Skin is warm and dry. She is not diaphoretic.   Psychiatric: She has a normal mood and affect. Her behavior is normal.   Nursing note and vitals reviewed.              PMH:  has a past medical history of Seasonal allergies and Unspecified asthma.  MEDS:   Current Outpatient Prescriptions:   •  predniSONE (DELTASONE) 20 MG Tab, Take 1 Tab by mouth every day for 5 days., Disp: 5 Tab, Rfl: 0  •  montelukast (SINGULAIR) 10 MG TABS, Take 1 Tab by mouth every day., Disp: 30 Tab, Rfl: 1  •  fluticasone (FLONASE) 50 MCG/ACT nasal spray, Spray 1 Spray in nose every day., Disp: , Rfl:   •  fluticasone (FLOVENT HFA) 110 MCG/ACT AERO, Inhale 2 Puffs by mouth every 12 hours., Disp: 2 Inhaler, Rfl: 1  •  cetirizine (ZYRTEC) 10 MG TABS, Take 1 Tab by mouth every day., Disp: 30 Tab, Rfl: 0  •  albuterol (PROVENTIL) 2.5 mg/0.5 mL NEBU, 10 mg by Nebulization route 4 times a day as needed., Disp: 3 Bottle, Rfl: 3  •  azithromycin (ZITHROMAX) 250 MG Tab, z-emily; U.D., Disp: 6 Tab, Rfl: 1  •  azithromycin (ZITHROMAX) 250 MG Tab, Take as directed, Disp: 6 Tab, Rfl: 0  •  guaifenesin-codeine (CHERATUSSIN AC) Solution oral solution, Take 5-10 mL by mouth every 6 hours as needed for Cough., Disp: 120 mL, Rfl: 0  •  VENTOLIN  (90 BASE) MCG/ACT Aero Soln inhalation aerosol, INHALE TWO PUFFS BY MOUTH EVERY 6 HOURS AS NEEDED FOR FOR SHORTNESS OF BREATH, Disp: 1 Inhaler, Rfl: 0  •  fluticasone-salmeterol (ADVAIR) 500-50 MCG/DOSE AEPB, Inhale 1 Puff  by mouth every 12 hours., Disp: 1 Inhaler, Rfl: 3  •  albuterol (VENTOLIN OR PROVENTIL) 108 (90 BASE) MCG/ACT AERS inhalation aerosol, Inhale 2 Puffs by mouth every 6 hours as needed for Shortness of Breath., Disp: 8.5 g, Rfl: 3  ALLERGIES:   Allergies   Allergen Reactions   • Naproxen      SURGHX:   Past Surgical History:   Procedure Laterality Date   • MOLE EXCISION      Birth betsy removal     SOCHX:  reports that she has never smoked. She has never used smokeless tobacco. She reports that she does not drink alcohol or use drugs.  FH: family history includes Cancer in her maternal grandmother; Diabetes in her maternal grandmother and mother; Hyperlipidemia in her father; Hypertension in her father and mother.    Assessment/Plan:     1. Mild intermittent asthma without complication  - predniSONE (DELTASONE) 20 MG Tab; Take 1 Tab by mouth every day for 5 days.  Dispense: 5 Tab; Refill: 0    Continue current asthma regimen. Call or return to office if symptoms persist or worsen after treatment with oral steroids. The patient demonstrated a good understanding and agreed with the treatment plan.

## 2017-09-13 ENCOUNTER — OFFICE VISIT (OUTPATIENT)
Dept: URGENT CARE | Facility: CLINIC | Age: 22
End: 2017-09-13
Payer: OTHER GOVERNMENT

## 2017-09-13 VITALS
HEIGHT: 68 IN | BODY MASS INDEX: 33.49 KG/M2 | TEMPERATURE: 98.6 F | OXYGEN SATURATION: 92 % | RESPIRATION RATE: 24 BRPM | DIASTOLIC BLOOD PRESSURE: 92 MMHG | WEIGHT: 221 LBS | HEART RATE: 120 BPM | SYSTOLIC BLOOD PRESSURE: 120 MMHG

## 2017-09-13 DIAGNOSIS — J45.30 MILD PERSISTENT ASTHMA WITHOUT COMPLICATION: ICD-10-CM

## 2017-09-13 PROCEDURE — 94640 AIRWAY INHALATION TREATMENT: CPT | Performed by: PHYSICIAN ASSISTANT

## 2017-09-13 PROCEDURE — 99214 OFFICE O/P EST MOD 30 MIN: CPT | Mod: 25 | Performed by: PHYSICIAN ASSISTANT

## 2017-09-13 RX ORDER — IPRATROPIUM BROMIDE AND ALBUTEROL SULFATE 2.5; .5 MG/3ML; MG/3ML
3 SOLUTION RESPIRATORY (INHALATION) ONCE
Status: COMPLETED | OUTPATIENT
Start: 2017-09-13 | End: 2017-09-13

## 2017-09-13 RX ORDER — METHYLPREDNISOLONE SODIUM SUCCINATE 125 MG/2ML
125 INJECTION, POWDER, LYOPHILIZED, FOR SOLUTION INTRAMUSCULAR; INTRAVENOUS ONCE
Status: COMPLETED | OUTPATIENT
Start: 2017-09-13 | End: 2017-09-13

## 2017-09-13 RX ORDER — ALBUTEROL SULFATE 2.5 MG/3ML
2.5 SOLUTION RESPIRATORY (INHALATION) EVERY 4 HOURS PRN
Qty: 75 ML | Refills: 1 | Status: ON HOLD | OUTPATIENT
Start: 2017-09-13 | End: 2017-09-28

## 2017-09-13 RX ORDER — PREDNISONE 20 MG/1
20 TABLET ORAL DAILY
Qty: 5 TAB | Refills: 0 | Status: SHIPPED | OUTPATIENT
Start: 2017-09-13 | End: 2017-09-18

## 2017-09-13 RX ADMIN — IPRATROPIUM BROMIDE AND ALBUTEROL SULFATE 3 ML: 2.5; .5 SOLUTION RESPIRATORY (INHALATION) at 08:29

## 2017-09-13 RX ADMIN — METHYLPREDNISOLONE SODIUM SUCCINATE 125 MG: 125 INJECTION, POWDER, LYOPHILIZED, FOR SOLUTION INTRAMUSCULAR; INTRAVENOUS at 08:30

## 2017-09-13 ASSESSMENT — ENCOUNTER SYMPTOMS
SHORTNESS OF BREATH: 1
VOMITING: 1
CONSTITUTIONAL NEGATIVE: 1
SORE THROAT: 0
COUGH: 0
SPUTUM PRODUCTION: 0
WHEEZING: 1

## 2017-09-13 NOTE — PROGRESS NOTES
"Subjective:      Nia Caballero is a 22 y.o. female who presents with No chief complaint on file.            Wheezing    This is a new problem. The current episode started in the past 7 days. The problem occurs intermittently. The problem has been waxing and waning. Associated symptoms include shortness of breath and vomiting. Pertinent negatives include no coughing, sore throat or sputum production. Nothing aggravates the symptoms. She has tried nothing for the symptoms. The treatment provided no relief. Her past medical history is significant for asthma.       Review of Systems   Constitutional: Negative.    HENT: Negative for sore throat.    Respiratory: Positive for shortness of breath and wheezing. Negative for cough and sputum production.    Gastrointestinal: Positive for vomiting.   Skin: Negative.           Objective:     There were no vitals taken for this visit.     Physical Exam   Constitutional: She is oriented to person, place, and time. She appears well-developed and well-nourished. No distress.   HENT:   Mouth/Throat: Oropharynx is clear and moist.   Eyes: EOM are normal. Pupils are equal, round, and reactive to light.   Neck: Normal range of motion. Neck supple.   Cardiovascular: Normal rate.    Pulmonary/Chest: Effort normal. No respiratory distress. She has wheezes. She has no rales.   Neurological: She is alert and oriented to person, place, and time.   Skin: Skin is warm and dry.   Psychiatric: She has a normal mood and affect. Her behavior is normal. Judgment and thought content normal.   Nursing note and vitals reviewed.    Vitals:    09/13/17 0816   BP: 120/92   Pulse: (!) 120   Resp: (!) 24   Temp: 37 °C (98.6 °F)   SpO2: 92%   Weight: 100.2 kg (221 lb)   Height: 1.727 m (5' 8\")     Active Ambulatory Problems     Diagnosis Date Noted   • Asthma 01/29/2015   • Asthma exacerbation 04/02/2015     Resolved Ambulatory Problems     Diagnosis Date Noted   • No Resolved Ambulatory Problems "     Past Medical History:   Diagnosis Date   • Seasonal allergies    • Unspecified asthma      Current Outpatient Prescriptions on File Prior to Visit   Medication Sig Dispense Refill   • VENTOLIN  (90 BASE) MCG/ACT Aero Soln inhalation aerosol INHALE TWO PUFFS BY MOUTH EVERY 6 HOURS AS NEEDED FOR FOR SHORTNESS OF BREATH 1 Inhaler 0   • montelukast (SINGULAIR) 10 MG TABS Take 1 Tab by mouth every day. 30 Tab 1   • fluticasone (FLONASE) 50 MCG/ACT nasal spray Spray 1 Spray in nose every day.     • fluticasone (FLOVENT HFA) 110 MCG/ACT AERO Inhale 2 Puffs by mouth every 12 hours. 2 Inhaler 1   • cetirizine (ZYRTEC) 10 MG TABS Take 1 Tab by mouth every day. 30 Tab 0   • albuterol (PROVENTIL) 2.5 mg/0.5 mL NEBU 10 mg by Nebulization route 4 times a day as needed. 3 Bottle 3   • predniSONE (DELTASONE) 20 MG Tab Take 1 Tab by mouth every day for 5 days. 5 Tab 0   • azithromycin (ZITHROMAX) 250 MG Tab z-emily; U.D. 6 Tab 1   • azithromycin (ZITHROMAX) 250 MG Tab Take as directed 6 Tab 0   • guaifenesin-codeine (CHERATUSSIN AC) Solution oral solution Take 5-10 mL by mouth every 6 hours as needed for Cough. 120 mL 0   • fluticasone-salmeterol (ADVAIR) 500-50 MCG/DOSE AEPB Inhale 1 Puff by mouth every 12 hours. 1 Inhaler 3   • albuterol (VENTOLIN OR PROVENTIL) 108 (90 BASE) MCG/ACT AERS inhalation aerosol Inhale 2 Puffs by mouth every 6 hours as needed for Shortness of Breath. 8.5 g 3     No current facility-administered medications on file prior to visit.      Gargles, Cepacol lozenges, Aleve/Advil as needed for throat pain  Family History   Problem Relation Age of Onset   • Diabetes Mother    • Hypertension Mother    • Hypertension Father    • Hyperlipidemia Father    • Cancer Maternal Grandmother    • Diabetes Maternal Grandmother    • Alcohol/Drug Neg Hx    • Allergies Neg Hx    • Anesthesia Neg Hx    • Heart Disease Neg Hx    • Psychiatry Neg Hx    • Stroke Neg Hx    • Thyroid Neg Hx      Naproxen               Assessment/Plan:     ·  asthma/whz      · rx meds; solumedrol 125mgIM

## 2017-09-28 ENCOUNTER — APPOINTMENT (OUTPATIENT)
Dept: RADIOLOGY | Facility: MEDICAL CENTER | Age: 22
DRG: 202 | End: 2017-09-28
Attending: EMERGENCY MEDICINE
Payer: OTHER GOVERNMENT

## 2017-09-28 ENCOUNTER — HOSPITAL ENCOUNTER (INPATIENT)
Facility: MEDICAL CENTER | Age: 22
LOS: 2 days | DRG: 202 | End: 2017-09-30
Attending: EMERGENCY MEDICINE | Admitting: INTERNAL MEDICINE
Payer: OTHER GOVERNMENT

## 2017-09-28 ENCOUNTER — RESOLUTE PROFESSIONAL BILLING HOSPITAL PROF FEE (OUTPATIENT)
Dept: HOSPITALIST | Facility: MEDICAL CENTER | Age: 22
End: 2017-09-28
Payer: OTHER GOVERNMENT

## 2017-09-28 DIAGNOSIS — J45.41 MODERATE PERSISTENT ASTHMA WITH ACUTE EXACERBATION: ICD-10-CM

## 2017-09-28 PROBLEM — J96.01 ACUTE RESPIRATORY FAILURE WITH HYPOXIA (HCC): Status: ACTIVE | Noted: 2017-09-28

## 2017-09-28 LAB
ANION GAP SERPL CALC-SCNC: 10 MMOL/L (ref 0–11.9)
BUN SERPL-MCNC: 10 MG/DL (ref 8–22)
CALCIUM SERPL-MCNC: 9.2 MG/DL (ref 8.5–10.5)
CHLORIDE SERPL-SCNC: 107 MMOL/L (ref 96–112)
CO2 SERPL-SCNC: 22 MMOL/L (ref 20–33)
CREAT SERPL-MCNC: 0.78 MG/DL (ref 0.5–1.4)
FLUAV H1 2009 PAND RNA SPEC QL NAA+PROBE: NOT DETECTED
FLUAV RNA SPEC QL NAA+PROBE: NEGATIVE
FLUBV RNA SPEC QL NAA+PROBE: NEGATIVE
GFR SERPL CREATININE-BSD FRML MDRD: >60 ML/MIN/1.73 M 2
GLUCOSE SERPL-MCNC: 129 MG/DL (ref 65–99)
MAGNESIUM SERPL-MCNC: 2.1 MG/DL (ref 1.5–2.5)
POTASSIUM SERPL-SCNC: 3.6 MMOL/L (ref 3.6–5.5)
PROCALCITONIN SERPL-MCNC: <0.05 NG/ML
SODIUM SERPL-SCNC: 139 MMOL/L (ref 135–145)

## 2017-09-28 PROCEDURE — 90686 IIV4 VACC NO PRSV 0.5 ML IM: CPT | Performed by: INTERNAL MEDICINE

## 2017-09-28 PROCEDURE — 700101 HCHG RX REV CODE 250

## 2017-09-28 PROCEDURE — 3E0234Z INTRODUCTION OF SERUM, TOXOID AND VACCINE INTO MUSCLE, PERCUTANEOUS APPROACH: ICD-10-PCS | Performed by: INTERNAL MEDICINE

## 2017-09-28 PROCEDURE — 96375 TX/PRO/DX INJ NEW DRUG ADDON: CPT

## 2017-09-28 PROCEDURE — 99223 1ST HOSP IP/OBS HIGH 75: CPT | Performed by: INTERNAL MEDICINE

## 2017-09-28 PROCEDURE — 96365 THER/PROPH/DIAG IV INF INIT: CPT

## 2017-09-28 PROCEDURE — 83735 ASSAY OF MAGNESIUM: CPT

## 2017-09-28 PROCEDURE — 700102 HCHG RX REV CODE 250 W/ 637 OVERRIDE(OP): Performed by: INTERNAL MEDICINE

## 2017-09-28 PROCEDURE — 700101 HCHG RX REV CODE 250: Performed by: INTERNAL MEDICINE

## 2017-09-28 PROCEDURE — 87502 INFLUENZA DNA AMP PROBE: CPT

## 2017-09-28 PROCEDURE — 94640 AIRWAY INHALATION TREATMENT: CPT

## 2017-09-28 PROCEDURE — 770006 HCHG ROOM/CARE - MED/SURG/GYN SEMI*

## 2017-09-28 PROCEDURE — 700111 HCHG RX REV CODE 636 W/ 250 OVERRIDE (IP): Performed by: INTERNAL MEDICINE

## 2017-09-28 PROCEDURE — 304562 HCHG STAT O2 MASK/CANNULA

## 2017-09-28 PROCEDURE — 90471 IMMUNIZATION ADMIN: CPT

## 2017-09-28 PROCEDURE — 87503 INFLUENZA DNA AMP PROB ADDL: CPT

## 2017-09-28 PROCEDURE — 99285 EMERGENCY DEPT VISIT HI MDM: CPT

## 2017-09-28 PROCEDURE — 700101 HCHG RX REV CODE 250: Performed by: EMERGENCY MEDICINE

## 2017-09-28 PROCEDURE — 71010 DX-CHEST-PORTABLE (1 VIEW): CPT

## 2017-09-28 PROCEDURE — 700111 HCHG RX REV CODE 636 W/ 250 OVERRIDE (IP): Performed by: EMERGENCY MEDICINE

## 2017-09-28 PROCEDURE — A9270 NON-COVERED ITEM OR SERVICE: HCPCS | Performed by: INTERNAL MEDICINE

## 2017-09-28 PROCEDURE — 770001 HCHG ROOM/CARE - MED/SURG/GYN PRIV*

## 2017-09-28 PROCEDURE — 84145 PROCALCITONIN (PCT): CPT

## 2017-09-28 PROCEDURE — 80048 BASIC METABOLIC PNL TOTAL CA: CPT

## 2017-09-28 RX ORDER — ENALAPRILAT 1.25 MG/ML
1.25 INJECTION INTRAVENOUS EVERY 6 HOURS PRN
Status: DISCONTINUED | OUTPATIENT
Start: 2017-09-28 | End: 2017-09-30 | Stop reason: HOSPADM

## 2017-09-28 RX ORDER — METHYLPREDNISOLONE SODIUM SUCCINATE 125 MG/2ML
62.5 INJECTION, POWDER, LYOPHILIZED, FOR SOLUTION INTRAMUSCULAR; INTRAVENOUS EVERY 12 HOURS
Status: DISCONTINUED | OUTPATIENT
Start: 2017-09-28 | End: 2017-09-30 | Stop reason: HOSPADM

## 2017-09-28 RX ORDER — GUAIFENESIN 600 MG/1
600 TABLET, EXTENDED RELEASE ORAL EVERY 12 HOURS
Status: DISCONTINUED | OUTPATIENT
Start: 2017-09-28 | End: 2017-09-30 | Stop reason: HOSPADM

## 2017-09-28 RX ORDER — ALBUTEROL SULFATE 90 UG/1
2 AEROSOL, METERED RESPIRATORY (INHALATION)
Status: DISCONTINUED | OUTPATIENT
Start: 2017-09-28 | End: 2017-09-30 | Stop reason: HOSPADM

## 2017-09-28 RX ORDER — ONDANSETRON 4 MG/1
4 TABLET, ORALLY DISINTEGRATING ORAL EVERY 4 HOURS PRN
Status: DISCONTINUED | OUTPATIENT
Start: 2017-09-28 | End: 2017-09-30 | Stop reason: HOSPADM

## 2017-09-28 RX ORDER — ACETAMINOPHEN 325 MG/1
650 TABLET ORAL EVERY 6 HOURS PRN
Status: DISCONTINUED | OUTPATIENT
Start: 2017-09-28 | End: 2017-09-30 | Stop reason: HOSPADM

## 2017-09-28 RX ORDER — IPRATROPIUM BROMIDE AND ALBUTEROL SULFATE 2.5; .5 MG/3ML; MG/3ML
3 SOLUTION RESPIRATORY (INHALATION) ONCE
Status: DISPENSED | OUTPATIENT
Start: 2017-09-28 | End: 2017-09-29

## 2017-09-28 RX ORDER — PROMETHAZINE HYDROCHLORIDE 25 MG/1
12.5-25 TABLET ORAL EVERY 4 HOURS PRN
Status: DISCONTINUED | OUTPATIENT
Start: 2017-09-28 | End: 2017-09-30 | Stop reason: HOSPADM

## 2017-09-28 RX ORDER — AMOXICILLIN 250 MG
2 CAPSULE ORAL 2 TIMES DAILY
Status: DISCONTINUED | OUTPATIENT
Start: 2017-09-28 | End: 2017-09-30 | Stop reason: HOSPADM

## 2017-09-28 RX ORDER — IPRATROPIUM BROMIDE AND ALBUTEROL SULFATE 2.5; .5 MG/3ML; MG/3ML
SOLUTION RESPIRATORY (INHALATION)
Status: COMPLETED
Start: 2017-09-28 | End: 2017-09-28

## 2017-09-28 RX ORDER — POLYETHYLENE GLYCOL 3350 17 G/17G
1 POWDER, FOR SOLUTION ORAL
Status: DISCONTINUED | OUTPATIENT
Start: 2017-09-28 | End: 2017-09-30 | Stop reason: HOSPADM

## 2017-09-28 RX ORDER — IPRATROPIUM BROMIDE AND ALBUTEROL SULFATE 2.5; .5 MG/3ML; MG/3ML
3 SOLUTION RESPIRATORY (INHALATION)
Status: DISCONTINUED | OUTPATIENT
Start: 2017-09-28 | End: 2017-09-29

## 2017-09-28 RX ORDER — MAGNESIUM SULFATE 1 G/100ML
1 INJECTION INTRAVENOUS ONCE
Status: COMPLETED | OUTPATIENT
Start: 2017-09-28 | End: 2017-09-28

## 2017-09-28 RX ORDER — BUDESONIDE 0.5 MG/2ML
0.5 INHALANT ORAL
Status: DISCONTINUED | OUTPATIENT
Start: 2017-09-28 | End: 2017-09-30

## 2017-09-28 RX ORDER — CETIRIZINE HYDROCHLORIDE 10 MG/1
10 TABLET ORAL DAILY
Status: DISCONTINUED | OUTPATIENT
Start: 2017-09-28 | End: 2017-09-30 | Stop reason: HOSPADM

## 2017-09-28 RX ORDER — MAGNESIUM SULFATE HEPTAHYDRATE 500 MG/ML
1 INJECTION, SOLUTION INTRAMUSCULAR; INTRAVENOUS ONCE
Status: DISCONTINUED | OUTPATIENT
Start: 2017-09-28 | End: 2017-09-28

## 2017-09-28 RX ORDER — BENZONATATE 100 MG/1
100 CAPSULE ORAL 3 TIMES DAILY PRN
Status: DISCONTINUED | OUTPATIENT
Start: 2017-09-28 | End: 2017-09-30 | Stop reason: HOSPADM

## 2017-09-28 RX ORDER — ONDANSETRON 2 MG/ML
4 INJECTION INTRAMUSCULAR; INTRAVENOUS EVERY 4 HOURS PRN
Status: DISCONTINUED | OUTPATIENT
Start: 2017-09-28 | End: 2017-09-30 | Stop reason: HOSPADM

## 2017-09-28 RX ORDER — MONTELUKAST SODIUM 10 MG/1
10 TABLET ORAL DAILY
Status: DISCONTINUED | OUTPATIENT
Start: 2017-09-28 | End: 2017-09-30 | Stop reason: HOSPADM

## 2017-09-28 RX ORDER — BISACODYL 10 MG
10 SUPPOSITORY, RECTAL RECTAL
Status: DISCONTINUED | OUTPATIENT
Start: 2017-09-28 | End: 2017-09-30 | Stop reason: HOSPADM

## 2017-09-28 RX ORDER — FLUTICASONE PROPIONATE 50 MCG
1 SPRAY, SUSPENSION (ML) NASAL DAILY
Status: DISCONTINUED | OUTPATIENT
Start: 2017-09-28 | End: 2017-09-30 | Stop reason: HOSPADM

## 2017-09-28 RX ORDER — PROMETHAZINE HYDROCHLORIDE 25 MG/1
12.5-25 SUPPOSITORY RECTAL EVERY 4 HOURS PRN
Status: DISCONTINUED | OUTPATIENT
Start: 2017-09-28 | End: 2017-09-30 | Stop reason: HOSPADM

## 2017-09-28 RX ORDER — METHYLPREDNISOLONE SODIUM SUCCINATE 125 MG/2ML
125 INJECTION, POWDER, LYOPHILIZED, FOR SOLUTION INTRAMUSCULAR; INTRAVENOUS ONCE
Status: COMPLETED | OUTPATIENT
Start: 2017-09-28 | End: 2017-09-28

## 2017-09-28 RX ORDER — ACETAMINOPHEN 500 MG
1000 TABLET ORAL EVERY 6 HOURS PRN
COMMUNITY

## 2017-09-28 RX ADMIN — MONTELUKAST SODIUM 10 MG: 10 TABLET, FILM COATED ORAL at 08:43

## 2017-09-28 RX ADMIN — ALBUTEROL SULFATE 15 MG: 5 SOLUTION RESPIRATORY (INHALATION) at 02:56

## 2017-09-28 RX ADMIN — IPRATROPIUM BROMIDE AND ALBUTEROL SULFATE 3 ML: .5; 3 SOLUTION RESPIRATORY (INHALATION) at 12:04

## 2017-09-28 RX ADMIN — MAGNESIUM SULFATE IN DEXTROSE 1 G: 10 INJECTION, SOLUTION INTRAVENOUS at 02:35

## 2017-09-28 RX ADMIN — BUDESONIDE 0.5 MG: 0.5 SUSPENSION RESPIRATORY (INHALATION) at 08:31

## 2017-09-28 RX ADMIN — GUAIFENESIN 600 MG: 600 TABLET, EXTENDED RELEASE ORAL at 20:28

## 2017-09-28 RX ADMIN — IPRATROPIUM BROMIDE AND ALBUTEROL SULFATE 3 ML: .5; 3 SOLUTION RESPIRATORY (INHALATION) at 08:30

## 2017-09-28 RX ADMIN — METHYLPREDNISOLONE SODIUM SUCCINATE 62.5 MG: 125 INJECTION, POWDER, FOR SOLUTION INTRAMUSCULAR; INTRAVENOUS at 13:36

## 2017-09-28 RX ADMIN — INFLUENZA A VIRUS A/MICHIGAN/45/2015 X-275 (H1N1) ANTIGEN (FORMALDEHYDE INACTIVATED), INFLUENZA A VIRUS A/HONG KONG/4801/2014 X-263B (H3N2) ANTIGEN (FORMALDEHYDE INACTIVATED), INFLUENZA B VIRUS B/PHUKET/3073/2013 ANTIGEN (FORMALDEHYDE INACTIVATED), AND INFLUENZA B VIRUS B/BRISBANE/60/2008 ANTIGEN (FORMALDEHYDE INACTIVATED) 0.5 ML: 15; 15; 15; 15 INJECTION, SUSPENSION INTRAMUSCULAR at 14:35

## 2017-09-28 RX ADMIN — BUDESONIDE 0.5 MG: 0.5 SUSPENSION RESPIRATORY (INHALATION) at 18:35

## 2017-09-28 RX ADMIN — METHYLPREDNISOLONE SODIUM SUCCINATE 125 MG: 125 INJECTION, POWDER, FOR SOLUTION INTRAMUSCULAR; INTRAVENOUS at 02:14

## 2017-09-28 RX ADMIN — IPRATROPIUM BROMIDE AND ALBUTEROL SULFATE 3 ML: .5; 3 SOLUTION RESPIRATORY (INHALATION) at 18:34

## 2017-09-28 RX ADMIN — IPRATROPIUM BROMIDE AND ALBUTEROL SULFATE 3 ML: .5; 3 SOLUTION RESPIRATORY (INHALATION) at 22:32

## 2017-09-28 RX ADMIN — CETIRIZINE HYDROCHLORIDE 10 MG: 10 TABLET, FILM COATED ORAL at 08:43

## 2017-09-28 RX ADMIN — IPRATROPIUM BROMIDE AND ALBUTEROL SULFATE 3 ML: .5; 3 SOLUTION RESPIRATORY (INHALATION) at 01:57

## 2017-09-28 RX ADMIN — GUAIFENESIN 600 MG: 600 TABLET, EXTENDED RELEASE ORAL at 08:43

## 2017-09-28 RX ADMIN — IPRATROPIUM BROMIDE AND ALBUTEROL SULFATE 3 ML: .5; 3 SOLUTION RESPIRATORY (INHALATION) at 15:46

## 2017-09-28 RX ADMIN — FLUTICASONE PROPIONATE 50 MCG: 50 SPRAY, METERED NASAL at 08:43

## 2017-09-28 ASSESSMENT — COPD QUESTIONNAIRES
COPD SCREENING SCORE: 0
COPD SCREENING SCORE: 1
DO YOU EVER COUGH UP ANY MUCUS OR PHLEGM?: NO/ONLY WITH OCCASIONAL COLDS OR INFECTIONS
DURING THE PAST 4 WEEKS HOW MUCH DID YOU FEEL SHORT OF BREATH: SOME OF THE TIME
HAVE YOU SMOKED AT LEAST 100 CIGARETTES IN YOUR ENTIRE LIFE: NO/DON'T KNOW
HAVE YOU SMOKED AT LEAST 100 CIGARETTES IN YOUR ENTIRE LIFE: NO/DON'T KNOW
DURING THE PAST 4 WEEKS HOW MUCH DID YOU FEEL SHORT OF BREATH: NONE/LITTLE OF THE TIME
DO YOU EVER COUGH UP ANY MUCUS OR PHLEGM?: NO/ONLY WITH OCCASIONAL COLDS OR INFECTIONS

## 2017-09-28 ASSESSMENT — LIFESTYLE VARIABLES
EVER_SMOKED: NEVER
DO YOU DRINK ALCOHOL: NO
DO YOU DRINK ALCOHOL: NO

## 2017-09-28 ASSESSMENT — PATIENT HEALTH QUESTIONNAIRE - PHQ9
1. LITTLE INTEREST OR PLEASURE IN DOING THINGS: NOT AT ALL
SUM OF ALL RESPONSES TO PHQ QUESTIONS 1-9: 0
SUM OF ALL RESPONSES TO PHQ QUESTIONS 1-9: 0
SUM OF ALL RESPONSES TO PHQ9 QUESTIONS 1 AND 2: 0
1. LITTLE INTEREST OR PLEASURE IN DOING THINGS: NOT AT ALL
SUM OF ALL RESPONSES TO PHQ9 QUESTIONS 1 AND 2: 0

## 2017-09-28 ASSESSMENT — PAIN SCALES - GENERAL
PAINLEVEL_OUTOF10: 0

## 2017-09-28 NOTE — CARE PLAN
Problem: Communication  Goal: The ability to communicate needs accurately and effectively will improve  Outcome: MET Date Met: 09/28/17

## 2017-09-28 NOTE — ED PROVIDER NOTES
"ED Provider Note    ED Provider Note      Primary care provider: Tania Kaufman M.D.    CHIEF COMPLAINT  Chief Complaint   Patient presents with   • Asthma     sob x 1 week. acute sob x 1 hr       HPI  Nia Caballero is a 22 y.o. female who presents to the Emergency DepartmentChief complaint of asthma exacerbation. Patient's had increasing asthma attacks over the last few weeks. She actually had pretty profound event 2 weeks ago misplaced and send a courses of steroids. She's been off the steroids now for a few days and is had increasing use of her rescue inhaler increasing shortness breath at night she got to the point where she felt as though she cannot breathe all felt her heart racing. She presented to the emergency department for evaluation she's had slight cough she denies any fevers no headache altered mental status no abdominal pain.Historyhematuriaandmelenahematochezianoothercomplaintsatthistime.    REVIEW OF SYSTEMS  10 systems reviewed and otherwise negative, pertinent positives and negatives listed in the history of present illness.       PAST MEDICAL HISTORY   has a past medical history of Seasonal allergies and Unspecified asthma.    SURGICAL HISTORY   has a past surgical history that includes mole excision.    SOCIAL HISTORY  Social History   Substance Use Topics   • Smoking status: Never Smoker   • Smokeless tobacco: Never Used   • Alcohol use No      History   Drug Use No       FAMILY HISTORY  Non-Contributory    CURRENT MEDICATIONS  Home Medications    **Home medications have not yet been reviewed for this encounter**         ALLERGIES  Allergies   Allergen Reactions   • Naproxen        PHYSICAL EXAM  VITAL SIGNS: /76   Pulse (!) 121   Temp 36.3 °C (97.3 °F)   Resp (!) 50   Ht 1.727 m (5' 8\")   SpO2 94%   Pulse ox interpretation:Borderline hypoxia on  Constitutional: Alert and oriented x 3,Severe Distress  HEENT: Atraumatic normocephalic, pupils are equal round reactive to " light extraocular movements are intact. The nares is clear, external ears are normal, mouth shows moist mucous membranes  Neck: Supple, no JVD no tracheal deviation  Cardiovascular: Tachycardic no murmur rub or gallop 2+ pulses peripherally x4  Thorax & Lungs: Profound tachypnea, very faint wheeze at the apices nearly no air movement on my evaluation.   GI: Soft nontender nondistended positive bowel sounds, no peritoneal signs  : Deferred  Rectal: Deferred  Skin: Warm dry no acute rash or lesion  Musculoskeletal: Moving all extremities with full range and 5 of 5 strength, no acute  deformity  Neurologic: Cranial nerves III through XII are grossly intact, no sensory deficit, no cerebellar dysfunction   Psychiatric: Appropriate affect for situation at this time      DIAGNOSTIC STUDIES / PROCEDURES  LABS  Results for orders placed or performed during the hospital encounter of 09/28/17   PROCALCITONIN   Result Value Ref Range    Procalcitonin <0.05 <0.25 ng/mL   INFLUENZA BY PCR, A/B/H1N1   Result Value Ref Range    Influenza virus A RNA Negative Negative    Influenza virus B, PCR Negative Negative    Influenza A 2009, H1N1, PCR Not Detected Negative   Magnesium   Result Value Ref Range    Magnesium 2.1 1.5 - 2.5 mg/dL   BASIC METABOLIC PANEL   Result Value Ref Range    Sodium 139 135 - 145 mmol/L    Potassium 3.6 3.6 - 5.5 mmol/L    Chloride 107 96 - 112 mmol/L    Co2 22 20 - 33 mmol/L    Glucose 129 (H) 65 - 99 mg/dL    Bun 10 8 - 22 mg/dL    Creatinine 0.78 0.50 - 1.40 mg/dL    Calcium 9.2 8.5 - 10.5 mg/dL    Anion Gap 10.0 0.0 - 11.9   ESTIMATED GFR   Result Value Ref Range    GFR If African American >60 >60 mL/min/1.73 m 2    GFR If Non African American >60 >60 mL/min/1.73 m 2       All labs reviewed by me.      RADIOLOGY  DX-CHEST-PORTABLE (1 VIEW)   Final Result      No radiographic evidence of acute cardiopulmonary process.        The radiologist's interpretation of all radiological studies have been reviewed  by me.    COURSE & MEDICAL DECISION MAKING  Pertinent Labs & Imaging studies reviewed. (See chart for details)    2:02 AM - Patient seen and examined at bedside.       Medical Decision Making:Patient fairly profound asthma exacerbation on arrival with tachypnea ,  Tachycardia, very little air movement she was given 125 mg of methylprednisolone DuoNeb at arrival followed by continuous albuterol nebulized treatment was also given 1 g of magnesium sulfate. She's doing somewhat better at this time however still having decreased air movement slight tachycardia with the extreme of her presentation I don't feel as though discharges advisable at this time I discuss case phosphorus Dr. Ashley who agrees on admission patient minute in guarded condition Dr. Ashley's help was greatly appreciated.        FINAL IMPRESSION  1. Moderate persistent asthma with acute exacerbation           This dictation has been created using voice recognition software and/or scribes. The accuracy of the dictation is limited by the abilities of the software and the expertise of the scribes. I expect there may be some errors of grammar and possibly content. I made every attempt to manually correct the errors within my dictation. However, errors related to voice recognition software and/or scribes may still exist and should be interpreted within the appropriate context.

## 2017-09-28 NOTE — H&P
CHIEF COMPLAINT:  Shortness of breath and wheezing.    HISTORY OF PRESENT ILLNESS:  This is a 22-year-old female with persistent   asthma, on scheduled Advair, and p.r.n. nebs.  She has been hospitalized 3   times for asthma exacerbation, but was never intubated for that.    Around 2 weeks ago, she started to have progressively worsening shortness of   breath, with associated nonproductive dry cough with paroxysms, along with   wheezing.  The shortness of breath usually worsens at night, worse with   exertion such as going down the stairs, which is unusual for her.  She denied   any fevers or chills.  She denied any chest pain, but her chest feels tight.    Since then, she has been using her p.r.n. Ventolin inhaler more often, going   through 1 inhaler in 5 days.  She then went to an urgent care center, who   prescribed her 5 days course of 20 mg of prednisone, which she completed, but   no improvement.  She returned to the urgent care center, who gave her 1 dose   of IV Solu-Medrol and prescribed her another 5 days course of prednisone but again   with no improvement in her symptoms.  Due to the persistence of her symptoms,   she then went to the ED today for further evaluation and management.    Notably, she denied any sick contacts or recent travels.  Patient states that   she is compliant with her asthma medications.    EMERGENCY DEPARTMENT COURSE:  The patient was initially evaluated in the   emergency department, was maintaining good hemodynamics, but tachycardic, and   tachypneic.  She is on 2 L oxygen by nasal cannula, but was afebrile.  She was   wheezy on auscultation.  Initial blood workup showed WBC count of 13.1 with   left shift, but no bandemia.  Chest x-ray was obtained and is pending at the   time of this dictation.  She was given multiple albuterol nebs, along with   magnesium sulfate and Solu-Medrol IV with improvement in her respiratory   status, although she still sounds wheezy and tight.  She  was then admitted to   the hospitalist service for further evaluation and management.    REVIEW OF SYSTEMS  A complete review of system was done. All other systems were negative.    PMH/PSH/FMH: I personally reviewed all ancillary histories as noted.    PAST MEDICAL HISTORY:  Past Medical History:   Diagnosis Date   • Seasonal allergies    • Unspecified asthma        PAST SURGICAL HISTORY:  Past Surgical History:   Procedure Laterality Date   • MOLE EXCISION      Birth betsy removal   • SINUSCOPY         PERSONAL/SOCIAL HISTORY:  Social History   Substance Use Topics   • Smoking status: Never Smoker   • Smokeless tobacco: Never Used   • Alcohol use No       FAMILY MEDICAL HISTORY:  Family History   Problem Relation Age of Onset   • Diabetes Mother    • Hypertension Mother    • Hypertension Father    • Hyperlipidemia Father    • Cancer Maternal Grandmother    • Diabetes Maternal Grandmother    • Alcohol/Drug Neg Hx    • Allergies Neg Hx    • Anesthesia Neg Hx    • Heart Disease Neg Hx    • Psychiatry Neg Hx    • Stroke Neg Hx    • Thyroid Neg Hx        ALLERGIES:  Naproxen    HOME MEDICATIONS:  Medication Sig   albuterol (PROVENTIL) 2.5mg/3ml Nebu Soln solution for nebulization 3 mL by Nebulization route every four hours as needed for Shortness of Breath.   guaifenesin-codeine (CHERATUSSIN AC) Solution oral solution Take 5-10 mL by mouth every 6 hours as needed for Cough.   VENTOLIN  (90 BASE) MCG/ACT Aero Soln inhalation aerosol INHALE TWO PUFFS BY MOUTH EVERY 6 HOURS AS NEEDED FOR FOR SHORTNESS OF BREATH   montelukast (SINGULAIR) 10 MG TABS Take 1 Tab by mouth every day.   fluticasone-salmeterol (ADVAIR) 500-50 MCG/DOSE AEPB Inhale 1 Puff by mouth every 12 hours.   fluticasone (FLONASE) 50 MCG/ACT nasal spray Spray 1 Spray in nose every day.   fluticasone (FLOVENT HFA) 110 MCG/ACT AERO Inhale 2 Puffs by mouth every 12 hours.   cetirizine (ZYRTEC) 10 MG TABS Take 1 Tab by mouth every day.   albuterol (VENTOLIN  OR PROVENTIL) 108 (90 BASE) MCG/ACT AERS inhalation aerosol Inhale 2 Puffs by mouth every 6 hours as needed for Shortness of Breath.   albuterol (PROVENTIL) 2.5 mg/0.5 mL NEBU 10 mg by Nebulization route 4 times a day as needed.           PHYSICAL EXAMINATION:  VITAL SIGNS:  Blood pressure 157/76, heart rate 121, respiratory rate 24,   oxygen saturation 94% on 2 liters, temperature 36.3 degrees Celsius.  CONSTITUTIONAL:  Obese, improved respiratory status, not in distress.  HENT: Normocephalic, atraumatic, (-) tonsillopharyngal congestion or exudate.  EYES: PERRLA, pink conjuctivae, (-) icteric sclerae  NECK: (-) cervical lymphadenopathy, (-) neck rigidity  CARDIOVASCULAR:  Tachycardic, regular rhythm.  No murmurs, rubs, or gallops.  RESPIRATORY:  Improved, but still tight and diminished air entry bilateral   lung fields, with diffuse expiratory wheezing.  GASTROINTESTINAL: normoactive bowel sounds, soft, (-) tenderness, (-) masses, (-) guarding/rebound  MUSCULOSKELETAL: (-) joint swelling/tenderness, (-) joint deformities, (-) muscle tenderness,   (-) gross limitation of movement of 4 extremities  SKIN: (-) erythema, warmth, rashes, ulcers, open wounds  PSYCHIATRIC: mood, affect, and thought content WNL, behavior age appropriate  NEUROLOGIC: Non-focal, moves all 4 extremities, sensory grossly intact        PERTINENT DIAGNOSTIC RESULTS:  Reviewed, and as mentioned above. Please refer to ED course.      ASSESSMENT:  1.  Acute respiratory failure with hypoxia due to asthma exacerbation.  2.  Acute asthma exacerbation.  3.  Leukocytosis, stroke, likely stress related.  4.  Obesity.    PLAN:  -- I will admit her to the medical unit.  I anticipate that she would need at   least 2 midnights hospital stay to provide medically necessary services.  -- I will continue her on intensive bronchodilator treatments with DuoNeb   every 4 hours, along with Albuterol nebulization every 2 hours p.r.n.  I will   place her on Pulmicort  nebulization for now.  Continue p.r.n. bronchodilators   per RT protocol.  Resume home dose Singulair.  I will start her on Solu-Medrol   62.5 mg b.i.d. IV for now, and taper with clinical improvement.  -- I will send for procalcitonin, and consider antibiotics if this is high.  I   will rule out influenza with flu PCR.  We will trend her WBC count.  -- I will continue her on p.r.n. oxygen to keep saturation above 88%, and we   will wean her from oxygen as able.    Deep venous thrombosis prophylaxis -- Lovenox subcutaneously.  Gastrointestinal prophylaxis -- not indicated.  Code status -- full code.       ____________________________________     LETICIA Godoy / NTS    DD:  09/28/2017 03:02:32  DT:  09/28/2017 03:30:42    D#:  [id number]  Job#:  [id number]

## 2017-09-28 NOTE — PROGRESS NOTES
Patient resting quietly in bed, no S/S of distress, AA&Ox4. Denies SOB post ED asthma treatments, chest pain, dizziness. Call light within reach,  pt calls appropriately and does not get out of bed. Bed in lowest position, bed locked, RN and CNA numbers provided, no further needs at this time. No changes from EPIC. Hourly rounding in place.

## 2017-09-28 NOTE — PROGRESS NOTES
Bed alarm refused despite education on fall risk. Safety interventions in place. Call light within reach. Upper bed rails up. Hourly rounding in place.

## 2017-09-29 PROBLEM — D72.829 LEUKOCYTOSIS: Status: ACTIVE | Noted: 2017-09-29

## 2017-09-29 LAB
ANION GAP SERPL CALC-SCNC: 10 MMOL/L (ref 0–11.9)
BASOPHILS # BLD AUTO: 0.2 % (ref 0–1.8)
BASOPHILS # BLD: 0.04 K/UL (ref 0–0.12)
BUN SERPL-MCNC: 10 MG/DL (ref 8–22)
CALCIUM SERPL-MCNC: 9.6 MG/DL (ref 8.5–10.5)
CHLORIDE SERPL-SCNC: 102 MMOL/L (ref 96–112)
CO2 SERPL-SCNC: 23 MMOL/L (ref 20–33)
CREAT SERPL-MCNC: 0.63 MG/DL (ref 0.5–1.4)
EOSINOPHIL # BLD AUTO: 0.02 K/UL (ref 0–0.51)
EOSINOPHIL NFR BLD: 0.1 % (ref 0–6.9)
ERYTHROCYTE [DISTWIDTH] IN BLOOD BY AUTOMATED COUNT: 43.9 FL (ref 35.9–50)
GFR SERPL CREATININE-BSD FRML MDRD: >60 ML/MIN/1.73 M 2
GLUCOSE SERPL-MCNC: 107 MG/DL (ref 65–99)
HCT VFR BLD AUTO: 39.5 % (ref 37–47)
HGB BLD-MCNC: 12.9 G/DL (ref 12–16)
IMM GRANULOCYTES # BLD AUTO: 0.18 K/UL (ref 0–0.11)
IMM GRANULOCYTES NFR BLD AUTO: 0.7 % (ref 0–0.9)
LYMPHOCYTES # BLD AUTO: 1.99 K/UL (ref 1–4.8)
LYMPHOCYTES NFR BLD: 8.2 % (ref 22–41)
MCH RBC QN AUTO: 29.7 PG (ref 27–33)
MCHC RBC AUTO-ENTMCNC: 32.7 G/DL (ref 33.6–35)
MCV RBC AUTO: 91 FL (ref 81.4–97.8)
MONOCYTES # BLD AUTO: 1.31 K/UL (ref 0–0.85)
MONOCYTES NFR BLD AUTO: 5.4 % (ref 0–13.4)
NEUTROPHILS # BLD AUTO: 20.75 K/UL (ref 2–7.15)
NEUTROPHILS NFR BLD: 85.4 % (ref 44–72)
NRBC # BLD AUTO: 0 K/UL
NRBC BLD AUTO-RTO: 0 /100 WBC
PLATELET # BLD AUTO: 420 K/UL (ref 164–446)
PMV BLD AUTO: 10.2 FL (ref 9–12.9)
POTASSIUM SERPL-SCNC: 3.6 MMOL/L (ref 3.6–5.5)
PROCALCITONIN SERPL-MCNC: <0.05 NG/ML
RBC # BLD AUTO: 4.34 M/UL (ref 4.2–5.4)
SODIUM SERPL-SCNC: 135 MMOL/L (ref 135–145)
WBC # BLD AUTO: 24.3 K/UL (ref 4.8–10.8)

## 2017-09-29 PROCEDURE — 770006 HCHG ROOM/CARE - MED/SURG/GYN SEMI*

## 2017-09-29 PROCEDURE — 99232 SBSQ HOSP IP/OBS MODERATE 35: CPT | Performed by: HOSPITALIST

## 2017-09-29 PROCEDURE — A9270 NON-COVERED ITEM OR SERVICE: HCPCS | Performed by: INTERNAL MEDICINE

## 2017-09-29 PROCEDURE — 80048 BASIC METABOLIC PNL TOTAL CA: CPT

## 2017-09-29 PROCEDURE — 700111 HCHG RX REV CODE 636 W/ 250 OVERRIDE (IP): Performed by: INTERNAL MEDICINE

## 2017-09-29 PROCEDURE — 700101 HCHG RX REV CODE 250: Performed by: INTERNAL MEDICINE

## 2017-09-29 PROCEDURE — 84145 PROCALCITONIN (PCT): CPT

## 2017-09-29 PROCEDURE — 85025 COMPLETE CBC W/AUTO DIFF WBC: CPT

## 2017-09-29 PROCEDURE — 36415 COLL VENOUS BLD VENIPUNCTURE: CPT

## 2017-09-29 PROCEDURE — 700102 HCHG RX REV CODE 250 W/ 637 OVERRIDE(OP): Performed by: INTERNAL MEDICINE

## 2017-09-29 PROCEDURE — 94640 AIRWAY INHALATION TREATMENT: CPT

## 2017-09-29 RX ORDER — IPRATROPIUM BROMIDE AND ALBUTEROL SULFATE 2.5; .5 MG/3ML; MG/3ML
3 SOLUTION RESPIRATORY (INHALATION)
Status: DISCONTINUED | OUTPATIENT
Start: 2017-09-30 | End: 2017-09-30 | Stop reason: HOSPADM

## 2017-09-29 RX ADMIN — MONTELUKAST SODIUM 10 MG: 10 TABLET, FILM COATED ORAL at 09:00

## 2017-09-29 RX ADMIN — GUAIFENESIN 600 MG: 600 TABLET, EXTENDED RELEASE ORAL at 19:26

## 2017-09-29 RX ADMIN — BUDESONIDE 0.5 MG: 0.5 SUSPENSION RESPIRATORY (INHALATION) at 12:34

## 2017-09-29 RX ADMIN — IPRATROPIUM BROMIDE AND ALBUTEROL SULFATE 3 ML: .5; 3 SOLUTION RESPIRATORY (INHALATION) at 08:49

## 2017-09-29 RX ADMIN — GUAIFENESIN 600 MG: 600 TABLET, EXTENDED RELEASE ORAL at 08:10

## 2017-09-29 RX ADMIN — METHYLPREDNISOLONE SODIUM SUCCINATE 62.5 MG: 125 INJECTION, POWDER, FOR SOLUTION INTRAMUSCULAR; INTRAVENOUS at 02:00

## 2017-09-29 RX ADMIN — IPRATROPIUM BROMIDE AND ALBUTEROL SULFATE 3 ML: .5; 3 SOLUTION RESPIRATORY (INHALATION) at 16:08

## 2017-09-29 RX ADMIN — IPRATROPIUM BROMIDE AND ALBUTEROL SULFATE 3 ML: .5; 3 SOLUTION RESPIRATORY (INHALATION) at 18:44

## 2017-09-29 RX ADMIN — IPRATROPIUM BROMIDE AND ALBUTEROL SULFATE 3 ML: .5; 3 SOLUTION RESPIRATORY (INHALATION) at 12:32

## 2017-09-29 RX ADMIN — CETIRIZINE HYDROCHLORIDE 10 MG: 10 TABLET, FILM COATED ORAL at 08:10

## 2017-09-29 RX ADMIN — METHYLPREDNISOLONE SODIUM SUCCINATE 62.5 MG: 125 INJECTION, POWDER, FOR SOLUTION INTRAMUSCULAR; INTRAVENOUS at 13:43

## 2017-09-29 RX ADMIN — ALBUTEROL SULFATE 2 PUFF: 90 AEROSOL, METERED RESPIRATORY (INHALATION) at 23:44

## 2017-09-29 RX ADMIN — ENOXAPARIN SODIUM 40 MG: 100 INJECTION SUBCUTANEOUS at 08:11

## 2017-09-29 RX ADMIN — IPRATROPIUM BROMIDE AND ALBUTEROL SULFATE 3 ML: .5; 3 SOLUTION RESPIRATORY (INHALATION) at 01:54

## 2017-09-29 ASSESSMENT — ENCOUNTER SYMPTOMS
WHEEZING: 1
DIZZINESS: 0
FEVER: 0
CHILLS: 0
VOMITING: 0
MYALGIAS: 0
TINGLING: 0
SPUTUM PRODUCTION: 0
SHORTNESS OF BREATH: 1
PALPITATIONS: 0
BACK PAIN: 0
COUGH: 1
NERVOUS/ANXIOUS: 0
HEADACHES: 0
DEPRESSION: 0
NAUSEA: 0

## 2017-09-29 ASSESSMENT — PAIN SCALES - GENERAL
PAINLEVEL_OUTOF10: 0

## 2017-09-29 ASSESSMENT — LIFESTYLE VARIABLES: EVER_SMOKED: NEVER

## 2017-09-29 ASSESSMENT — COPD QUESTIONNAIRES
DO YOU EVER COUGH UP ANY MUCUS OR PHLEGM?: NO/ONLY WITH OCCASIONAL COLDS OR INFECTIONS
COPD SCREENING SCORE: 1
HAVE YOU SMOKED AT LEAST 100 CIGARETTES IN YOUR ENTIRE LIFE: NO/DON'T KNOW
DURING THE PAST 4 WEEKS HOW MUCH DID YOU FEEL SHORT OF BREATH: SOME OF THE TIME

## 2017-09-29 NOTE — PROGRESS NOTES
[facility] Hospitalist Progress Note    Date of Service: 2017    Chief Complaint  22 y.o. female admitted 2017 with shortness of breath and wheezing.    Interval Problem Update  Feels better but her wheezing worsens at night  Shortness of breath is improved  Denies any fevers or chills    Consultants/Specialty  None; consider pulmonary    Disposition  To be determined        Review of Systems   Constitutional: Negative for chills and fever.   HENT: Negative for hearing loss.    Respiratory: Positive for cough, shortness of breath and wheezing. Negative for sputum production.    Cardiovascular: Negative for chest pain and palpitations.   Gastrointestinal: Negative for nausea and vomiting.   Genitourinary: Negative for dysuria and frequency.   Musculoskeletal: Negative for back pain and myalgias.   Skin: Negative for itching and rash.   Neurological: Negative for dizziness, tingling and headaches.   Psychiatric/Behavioral: Negative for depression. The patient is not nervous/anxious.       Physical Exam  Laboratory/Imaging   Hemodynamics  Temp (24hrs), Av.9 °C (98.4 °F), Min:36.6 °C (97.8 °F), Max:37.4 °C (99.3 °F)   Temperature: 37.4 °C (99.3 °F)  Pulse  Av.7  Min: 100  Max: 141    Blood Pressure: 126/64      Respiratory      Respiration: 20, Pulse Oximetry: 96 %, O2 Daily Delivery Respiratory : Silicone Nasal Cannula     Given By:: Mouthpiece, Work Of Breathing / Effort: Mild  RUL Breath Sounds: Clear, RML Breath Sounds: Clear, RLL Breath Sounds: Diminished, SILVANO Breath Sounds: Clear, LLL Breath Sounds: Diminished    Fluids  No intake or output data in the 24 hours ending 17 1345    Nutrition  Orders Placed This Encounter   Procedures   • Diet Order     Standing Status:   Standing     Number of Occurrences:   1     Order Specific Question:   Diet:     Answer:   Regular [1]     Physical Exam   Constitutional: She is oriented to person, place, and time. She appears well-developed and  well-nourished.   HENT:   Head: Normocephalic and atraumatic.   Eyes: Conjunctivae and EOM are normal. Pupils are equal, round, and reactive to light.   Neck: Normal range of motion. Neck supple. No JVD present.   Cardiovascular: Normal rate and regular rhythm.    Pulmonary/Chest: Effort normal. She has decreased breath sounds. She has wheezes (scattered expiratory).   Abdominal: Soft. Bowel sounds are normal. She exhibits no distension.   Musculoskeletal: Normal range of motion. She exhibits no edema.   Neurological: She is alert and oriented to person, place, and time.   Skin: Skin is warm and dry.   Nursing note and vitals reviewed.      Recent Labs      09/29/17 0219   WBC  24.3*   RBC  4.34   HEMOGLOBIN  12.9   HEMATOCRIT  39.5   MCV  91.0   MCH  29.7   MCHC  32.7*   RDW  43.9   PLATELETCT  420   MPV  10.2     Recent Labs      09/28/17 0216  09/29/17 0219   SODIUM  139  135   POTASSIUM  3.6  3.6   CHLORIDE  107  102   CO2  22  23   GLUCOSE  129*  107*   BUN  10  10   CREATININE  0.78  0.63   CALCIUM  9.2  9.6                      Assessment/Plan     Leukocytosis   Assessment & Plan    Likely 2/2 steroids        Acute respiratory failure with hypoxia (CMS-HCC)   Assessment & Plan    Secondary to asthma  Wean oxygen as tolerated        Asthma exacerbation   Assessment & Plan    Continue with breathing treatments  Continue respiratory protocol  Continue oral steroids  Continue with supplemental oxygen and wean as tolerated  Consider pulmonary consultation if no improvement by tomorrow            Reviewed items::  Radiology images reviewed, Labs reviewed and Medications reviewed  DVT prophylaxis pharmacological::  Enoxaparin (Lovenox)

## 2017-09-29 NOTE — PROGRESS NOTES
Sima Gore Fall Risk Assessment:     Last Known Fall: No falls  Mobility: No limitations  Medications: No meds  Mental Status/LOC/Awareness: Awake, alert, and oriented to date, place, and person  Toileting Needs: No needs  Volume/Electrolyte Status: No problems  Communication/Sensory: No deficits  Behavior: Appropriate behavior  Sima Gore Fall Risk Total: 1  Fall Risk Level: NO RISK    Universal Fall Precautions:  call light/belongings in reach, bed in low position and locked, wheelchairs and assistive devices out of sight, siderails up x 2, use non-slip footwear, adequate lighting, clutter free and spill free environment, educate on level of risk, educate to call for assistance    Fall Risk Level Interventions:          Patient Specific Interventions:     Medication: review medications with patient and family, assess for medications that can be discontinued or dosage decreased and limit combination of prn medications  Mental Status/LOC/Awareness: reinforce falls education, check on patient hourly, reinforce the use of call light and provide activity  Toileting: provide frquent toileting and monitor intake and output/use of appropriate interventions  Volume/Electrolyte Status: ensure patient remains hydrated and teach patients to dangle before rising if hypotensive  Communication/Sensory: update plan of care on whiteboard  Behavioral: encourage patient to voice feelings, engage patient in daily activities, administer medication as ordered and instruct/reinforce fall program rationale  Mobility: schedule physical activity throughout the day, dangle prior to standing and ensure bed is locked and in lowest position

## 2017-09-29 NOTE — PROGRESS NOTES
Received bedside report from ALLIE Vazquez and assumed care of patient at 0000. Patient is alert and oriented x4 with no signs of labored breathing or distress. Patient updated on plan of care; verbalizes understanding. Safety precautions in place including patient call light within reach, bed alarm not required per risk assessment, personal possessions nearby, bed in low position and locked, hourly rounding in practice, and non-skid socks in place.

## 2017-09-29 NOTE — CARE PLAN
Problem: Respiratory:  Goal: Respiratory status will improve    Intervention: Administer and titrate oxygen therapy  Pt on 1 LO2 via NC,  in place.      Problem: Knowledge Deficit  Goal: Knowledge of disease process/condition, treatment plan, diagnostic tests, and medications will improve    Intervention: Explain information regarding disease process/condition, treatment plan, diagnostic tests, and medications and document in education  Patient educated about medications and rationale for prescribed medication.

## 2017-09-29 NOTE — PROGRESS NOTES
Patient observed sitting up in bed watching TV, no SOB of breast or dyspnea noted on assessment.  Patient ambulates to bathroom independent, educated on fall precautions, despite education given, refused bed alarm.  Call light within reach.

## 2017-09-29 NOTE — PROGRESS NOTES
"Pt c/o SOB and wheezing. RT called for nebulizer treatment. Pt placed on  and 1L NC. Pt SaO2 at 94% HR 130s RR 24. IV Solumedrol given per MAR. RT at bedside at 0155. Pt stated \" I'm breathing easier\" after RT treatment. Wheezes still auscultated. HR down to 110s w/ SaO2 96% RR 20.   "

## 2017-09-29 NOTE — PROGRESS NOTES
Received report from night RN, assumed care at 0700. Pt A&OX4, able to specify needs. Pt up self, maintains steady gait. Pt on 1 LO2 via NC,  in place. Pt denies SOB, N&V, acute pain. Bed in lowest and locked position, non-skid socks in place. Pt refusing bed alarm, pt calls appropriately. Pt states all needs are met at this time. POC discussed, communication board updated. Call light in reach, hourly rounding in place.

## 2017-09-29 NOTE — CARE PLAN
Problem: Respiratory:  Goal: Respiratory status will improve  Outcome: PROGRESSING AS EXPECTED  Patient is on room air, uses O2 when needed.  Encouraged to use incentive spirometer while awake.  Coughing and deep breathing performed correctly.    Problem: Venous Thromboembolism (VTW)/Deep Vein Thrombosis (DVT) Prevention:  Goal: Patient will participate in Venous Thrombosis (VTE)/Deep Vein Thrombosis (DVT)Prevention Measures  Outcome: PROGRESSING AS EXPECTED  Patient ambulates without difficulty.  SCD's to bilateral lower extremities in place.  Lovenox given subcutaneous as ordered.

## 2017-09-29 NOTE — ASSESSMENT & PLAN NOTE
Continue with breathing treatments  Continue respiratory protocol  Continue oral steroids  Continue with supplemental oxygen and wean as tolerated  She does well during the day but has her exacerbations at night  Pulmonary consulted

## 2017-09-30 VITALS
DIASTOLIC BLOOD PRESSURE: 65 MMHG | WEIGHT: 220.9 LBS | RESPIRATION RATE: 16 BRPM | OXYGEN SATURATION: 94 % | HEART RATE: 84 BPM | BODY MASS INDEX: 33.48 KG/M2 | TEMPERATURE: 98 F | HEIGHT: 68 IN | SYSTOLIC BLOOD PRESSURE: 125 MMHG

## 2017-09-30 PROCEDURE — A9270 NON-COVERED ITEM OR SERVICE: HCPCS | Performed by: INTERNAL MEDICINE

## 2017-09-30 PROCEDURE — 700102 HCHG RX REV CODE 250 W/ 637 OVERRIDE(OP): Performed by: INTERNAL MEDICINE

## 2017-09-30 PROCEDURE — 94640 AIRWAY INHALATION TREATMENT: CPT

## 2017-09-30 PROCEDURE — 94760 N-INVAS EAR/PLS OXIMETRY 1: CPT

## 2017-09-30 PROCEDURE — 700101 HCHG RX REV CODE 250: Performed by: INTERNAL MEDICINE

## 2017-09-30 PROCEDURE — 700111 HCHG RX REV CODE 636 W/ 250 OVERRIDE (IP): Performed by: INTERNAL MEDICINE

## 2017-09-30 PROCEDURE — 99239 HOSP IP/OBS DSCHRG MGMT >30: CPT | Performed by: HOSPITALIST

## 2017-09-30 RX ORDER — IPRATROPIUM BROMIDE AND ALBUTEROL SULFATE 2.5; .5 MG/3ML; MG/3ML
3 SOLUTION RESPIRATORY (INHALATION) 4 TIMES DAILY
Qty: 360 ML | Refills: 0 | Status: SHIPPED | OUTPATIENT
Start: 2017-09-30 | End: 2017-10-30

## 2017-09-30 RX ORDER — BUDESONIDE AND FORMOTEROL FUMARATE DIHYDRATE 160; 4.5 UG/1; UG/1
2 AEROSOL RESPIRATORY (INHALATION) 2 TIMES DAILY
Qty: 1 INHALER | Refills: 2 | Status: SHIPPED | OUTPATIENT
Start: 2017-09-30 | End: 2017-10-02

## 2017-09-30 RX ORDER — BUDESONIDE AND FORMOTEROL FUMARATE DIHYDRATE 160; 4.5 UG/1; UG/1
2 AEROSOL RESPIRATORY (INHALATION) 2 TIMES DAILY
Status: DISCONTINUED | OUTPATIENT
Start: 2017-09-30 | End: 2017-09-30 | Stop reason: HOSPADM

## 2017-09-30 RX ORDER — PREDNISONE 20 MG/1
20 TABLET ORAL DAILY
Qty: 5 TAB | Refills: 0 | Status: SHIPPED | OUTPATIENT
Start: 2017-09-30 | End: 2017-10-02

## 2017-09-30 RX ADMIN — IPRATROPIUM BROMIDE AND ALBUTEROL SULFATE 3 ML: .5; 3 SOLUTION RESPIRATORY (INHALATION) at 11:00

## 2017-09-30 RX ADMIN — ALBUTEROL SULFATE 2 PUFF: 90 AEROSOL, METERED RESPIRATORY (INHALATION) at 02:07

## 2017-09-30 RX ADMIN — MONTELUKAST SODIUM 10 MG: 10 TABLET, FILM COATED ORAL at 08:32

## 2017-09-30 RX ADMIN — IPRATROPIUM BROMIDE AND ALBUTEROL SULFATE 3 ML: .5; 3 SOLUTION RESPIRATORY (INHALATION) at 07:47

## 2017-09-30 RX ADMIN — BUDESONIDE 0.5 MG: 0.5 SUSPENSION RESPIRATORY (INHALATION) at 07:51

## 2017-09-30 RX ADMIN — CETIRIZINE HYDROCHLORIDE 10 MG: 10 TABLET, FILM COATED ORAL at 08:31

## 2017-09-30 RX ADMIN — ALBUTEROL SULFATE 2.5 MG: 2.5 SOLUTION RESPIRATORY (INHALATION) at 01:10

## 2017-09-30 RX ADMIN — FLUTICASONE PROPIONATE 50 MCG: 50 SPRAY, METERED NASAL at 08:32

## 2017-09-30 RX ADMIN — GUAIFENESIN 600 MG: 600 TABLET, EXTENDED RELEASE ORAL at 08:32

## 2017-09-30 RX ADMIN — BUDESONIDE AND FORMOTEROL FUMARATE DIHYDRATE 2 PUFF: 160; 4.5 AEROSOL RESPIRATORY (INHALATION) at 13:10

## 2017-09-30 RX ADMIN — METHYLPREDNISOLONE SODIUM SUCCINATE 62.5 MG: 125 INJECTION, POWDER, FOR SOLUTION INTRAMUSCULAR; INTRAVENOUS at 00:45

## 2017-09-30 ASSESSMENT — ENCOUNTER SYMPTOMS
MYALGIAS: 0
TINGLING: 0
CHILLS: 0
CONSTIPATION: 0
WHEEZING: 1
HEADACHES: 0
DIZZINESS: 0
FEVER: 0
DEPRESSION: 0
SHORTNESS OF BREATH: 1
ABDOMINAL PAIN: 0
NERVOUS/ANXIOUS: 0
DIARRHEA: 0
BACK PAIN: 0
SPUTUM PRODUCTION: 0
PALPITATIONS: 0
COUGH: 1

## 2017-09-30 ASSESSMENT — PAIN SCALES - GENERAL: PAINLEVEL_OUTOF10: 0

## 2017-09-30 NOTE — PROGRESS NOTES
Received report from night RN, assumed care at 0700. Pt A&OX4, able to specify needs. Pt up self, maintains steady gait. Pt denies SOB, N&V, acute pain at this time. Bed alarm not indicated, pt calls appropriately. Pt states all needs are met at this time. POC discussed, communication board updated. Call light in reach, hourly rounding in place.

## 2017-09-30 NOTE — CARE PLAN
Problem: Safety  Goal: Will remain free from falls    Intervention: Implement fall precautions  Bed in lowest and locked position, non-skid socks in place, call light in reach, hourly rounding in place.      Problem: Knowledge Deficit  Goal: Knowledge of disease process/condition, treatment plan, diagnostic tests, and medications will improve    Intervention: Explain information regarding disease process/condition, treatment plan, diagnostic tests, and medications and document in education  Patient educated about medications and rationale for prescribed medications.

## 2017-09-30 NOTE — PROGRESS NOTES
Sima Gore Fall Risk Assessment:     Last Known Fall: No falls  Mobility: No limitations  Medications: No meds  Mental Status/LOC/Awareness: Awake, alert, and oriented to date, place, and person  Toileting Needs: No needs  Volume/Electrolyte Status: No problems  Communication/Sensory: No deficits  Behavior: Appropriate behavior  Sima Gore Fall Risk Total: 1  Fall Risk Level: NO RISK    Universal Fall Precautions:  call light/belongings in reach, bed in low position and locked, siderails up x 2, use non-slip footwear, adequate lighting, clutter free and spill free environment, educate on level of risk, educate to call for assistance    Fall Risk Level Interventions:          Patient Specific Interventions:     Medication: review medications with patient and family  Mental Status/LOC/Awareness: not applicable  Toileting: not applicable  Volume/Electrolyte Status: ensure patient remains hydrated  Communication/Sensory: update plan of care on whiteboard  Behavioral: encourage patient to voice feelings  Mobility: ensure bed is locked and in lowest position

## 2017-09-30 NOTE — PROGRESS NOTES
Sima Gore Fall Risk Assessment:     Last Known Fall: No falls  Mobility: No limitations  Medications: No meds  Mental Status/LOC/Awareness: Awake, alert, and oriented to date, place, and person  Toileting Needs: No needs  Volume/Electrolyte Status: No problems  Communication/Sensory: No deficits  Behavior: Appropriate behavior  Sima Gore Fall Risk Total: 1  Fall Risk Level: NO RISK    Universal Fall Precautions:  call light/belongings in reach, bed in low position and locked, wheelchairs and assistive devices out of sight, siderails up x 2, use non-slip footwear, adequate lighting, clutter free and spill free environment, educate on level of risk, educate to call for assistance    Fall Risk Level Interventions:          Patient Specific Interventions:     Medication: review medications with patient and family, assess for medications that can be discontinued or dosage decreased and limit combination of prn medications  Mental Status/LOC/Awareness: reinforce falls education, check on patient hourly, reinforce the use of call light and provide activity  Toileting: provide frquent toileting  Volume/Electrolyte Status: ensure patient remains hydrated and monitor abnormal lab values  Communication/Sensory: update plan of care on whiteboard  Behavioral: encourage patient to voice feelings, engage patient in daily activities, administer medication as ordered and instruct/reinforce fall program rationale  Mobility: schedule physical activity throughout the day, dangle prior to standing and ensure bed is locked and in lowest position

## 2017-09-30 NOTE — CARE PLAN
"Problem: Respiratory:  Goal: Respiratory status will improve  Outcome: PROGRESSING SLOWER THAN EXPECTED  Pt experiences SOB and wheezing late at night. RT called for neb treatment. Albuterol inhaler given per MAR. Pt states \"it always happens around the same time at night\".  in place. Pt on 1.5L NC. SaO2 remained >90% entire shift.     Problem: Venous Thromboembolism (VTW)/Deep Vein Thrombosis (DVT) Prevention:  Goal: Patient will participate in Venous Thrombosis (VTE)/Deep Vein Thrombosis (DVT)Prevention Measures  Outcome: PROGRESSING SLOWER THAN EXPECTED  IV site bleeding. Pressure held at site for 5 minutes to stop bleeding. Dressing changed. IV patent. Pt on Lovenox for DVT; pt is ambulatory.       "

## 2017-09-30 NOTE — DISCHARGE SUMMARY
CHIEF COMPLAINT ON ADMISSION  Chief Complaint   Patient presents with   • Asthma     sob x 1 week. acute sob x 1 hr       CODE STATUS  Full Code    HPI & HOSPITAL COURSE  This is a 22 y.o. female here withShortness of breath and wheezing. She has a history of asthma. She states that she has had asthma exacerbation type symptoms for over 2 weeks. She lives at home with few roommates who have a cat. She feels that her asthma has never been controlled. She was on oral steroids as an outpatient but she felt short of breath and wheezing at times. Nonetheless she presented to the hospital was admitted for asthma exacerbation. She is put on breathing treatments and RT protocol. Sputum on IV steroids. She has maintained on supplemental oxygen as needed. Over the course of the hospitalization symptoms persisted but improved. I consulted pulmonary and is recommended that she is on the appropriate medications. Also recommended that she try to get away from the cat. She has also been scheduled to follow-up with ENT as an outpatient. At the time of discharge her respiratory status has improved. She is no longer being as good air entry. Oxygen saturations are adequate as well. She is to be discharged home in stable condition.    Therefore, she is discharged in good and stable condition with close outpatient follow-up.    SPECIFIC OUTPATIENT FOLLOW-UP  PCP as soon as possible  Pulmonology as an outpatient   ENT    DISCHARGE PROBLEM LIST  Principal Problem:    Asthma exacerbation POA: Unknown  Active Problems:    Acute respiratory failure with hypoxia (CMS-HCC) POA: Unknown    Leukocytosis POA: Unknown  Resolved Problems:    * No resolved hospital problems. *      FOLLOW UP  No future appointments.  Tania Kaufman M.D.  [address]  [address]  [place] [id number] [phone]    Schedule an appointment as soon as possible for a visit in 2 weeks  Hospital follow-up appointment with PCP    [facility] Specialist  [place]  [id number]  [phone]    Call        MEDICATIONS ON DISCHARGE   Nia Caballero   Home Medication Instructions JOSSELINE:[id number]    Printed on:09/30/17 1131   Medication Information                      acetaminophen (TYLENOL) 500 MG Tab  Take 1,000 mg by mouth every 6 hours as needed for Mild Pain.             albuterol (PROVENTIL) 2.5 mg/0.5 mL NEBU  10 mg by Nebulization route 4 times a day as needed.             cetirizine (ZYRTEC) 10 MG TABS  Take 1 Tab by mouth every day.             fluticasone (FLONASE) 50 MCG/ACT nasal spray  Spray 1 Spray in nose every day.             fluticasone-salmeterol (ADVAIR) 500-50 MCG/DOSE AEPB  Inhale 1 Puff by mouth every 12 hours.             montelukast (SINGULAIR) 10 MG TABS  Take 1 Tab by mouth every day.                 DIET  Orders Placed This Encounter   Procedures   • Diet Order     Standing Status:   Standing     Number of Occurrences:   1     Order Specific Question:   Diet:     Answer:   Regular [1]       ACTIVITY  As tolerated.  Weight bearing as tolerated      CONSULTATIONS  Pulmonary    PROCEDURES  None    LABORATORY  Lab Results   Component Value Date/Time    SODIUM 135 09/29/2017 02:19 AM    POTASSIUM 3.6 09/29/2017 02:19 AM    CHLORIDE 102 09/29/2017 02:19 AM    CO2 23 09/29/2017 02:19 AM    GLUCOSE 107 (H) 09/29/2017 02:19 AM    BUN 10 09/29/2017 02:19 AM    CREATININE 0.63 09/29/2017 02:19 AM        Lab Results   Component Value Date/Time    WBC 24.3 (H) 09/29/2017 02:19 AM    HEMOGLOBIN 12.9 09/29/2017 02:19 AM    HEMATOCRIT 39.5 09/29/2017 02:19 AM    PLATELETCT 420 09/29/2017 02:19 AM        Total time of the discharge process exceeds 31 minutes

## 2017-09-30 NOTE — PROGRESS NOTES
Pt still c/o SOB and wheezing post neb treatment at 0110. SaO2 at 95% RR22  on 1.5L. PRN inhaler given per pt request.

## 2017-09-30 NOTE — PROGRESS NOTES
[facility] Hospitalist Progress Note    Date of Service: 2017    Chief Complaint  22 y.o. female admitted 2017 with shortness of breath and wheezing.    Interval Problem Update  Had another episode of wheezing and shortness of breath overnight where she became tachypneic. His been occurring over the past 2 nights. Currently she feels well and has no complaints    Consultants/Specialty  Pulmonary consultation    Disposition  To be determined        Review of Systems   Constitutional: Negative for chills and fever.   HENT: Negative for hearing loss.    Respiratory: Positive for cough, shortness of breath and wheezing. Negative for sputum production.    Cardiovascular: Negative for chest pain and palpitations.   Gastrointestinal: Negative for abdominal pain, constipation and diarrhea.   Genitourinary: Negative for dysuria and frequency.   Musculoskeletal: Negative for back pain and myalgias.   Skin: Negative for itching and rash.   Neurological: Negative for dizziness, tingling and headaches.   Psychiatric/Behavioral: Negative for depression. The patient is not nervous/anxious.       Physical Exam  Laboratory/Imaging   Hemodynamics  Temp (24hrs), Av.8 °C (98.3 °F), Min:36.5 °C (97.7 °F), Max:37.1 °C (98.8 °F)   Temperature: 36.7 °C (98 °F)  Pulse  Av.8  Min: 85  Max: 141    Blood Pressure: 125/65      Respiratory      Respiration: 17, Pulse Oximetry: 97 %, O2 Daily Delivery Respiratory : Silicone Nasal Cannula     Given By:: Mouthpiece, Work Of Breathing / Effort: Mild  RUL Breath Sounds: Clear, RML Breath Sounds: Diminished, RLL Breath Sounds: Clear;Diminished, SILVANO Breath Sounds: Clear, LLL Breath Sounds: Diminished    Fluids    Intake/Output Summary (Last 24 hours) at 17 1052  Last data filed at 17 0900   Gross per 24 hour   Intake              700 ml   Output                0 ml   Net              700 ml       Nutrition  Orders Placed This Encounter   Procedures   • Diet Order      Standing Status:   Standing     Number of Occurrences:   1     Order Specific Question:   Diet:     Answer:   Regular [1]     Physical Exam   Constitutional: She is oriented to person, place, and time. No distress.   HENT:   Head: Normocephalic and atraumatic.   Mouth/Throat: No oropharyngeal exudate.   Eyes: Conjunctivae are normal. Pupils are equal, round, and reactive to light. No scleral icterus.   Neck: Normal range of motion. Neck supple. No JVD present.   Cardiovascular: Normal rate and regular rhythm.    Pulmonary/Chest: Effort normal. No respiratory distress. She has decreased breath sounds. She has no wheezes.   Abdominal: Soft. Bowel sounds are normal. She exhibits no distension. There is no tenderness.   Musculoskeletal: She exhibits no edema or tenderness.   Neurological: She is alert and oriented to person, place, and time.   Skin: Skin is warm and dry. She is not diaphoretic.   Nursing note and vitals reviewed.      Recent Labs      09/29/17   0219   WBC  24.3*   RBC  4.34   HEMOGLOBIN  12.9   HEMATOCRIT  39.5   MCV  91.0   MCH  29.7   MCHC  32.7*   RDW  43.9   PLATELETCT  420   MPV  10.2     Recent Labs      09/28/17   0216  09/29/17   0219   SODIUM  139  135   POTASSIUM  3.6  3.6   CHLORIDE  107  102   CO2  22  23   GLUCOSE  129*  107*   BUN  10  10   CREATININE  0.78  0.63   CALCIUM  9.2  9.6                      Assessment/Plan     * Asthma exacerbation   Assessment & Plan    Continue with breathing treatments  Continue respiratory protocol  Continue oral steroids  Continue with supplemental oxygen and wean as tolerated  She does well during the day but has her exacerbations at night  Pulmonary consulted        Acute respiratory failure with hypoxia (CMS-[facility])   Assessment & Plan    Secondary to asthma  Wean oxygen as tolerated        Leukocytosis   Assessment & Plan    Likely 2/2 steroids            Reviewed items::  Radiology images reviewed, Labs reviewed and Medications reviewed  DVT  prophylaxis pharmacological::  Enoxaparin (Lovenox)

## 2017-09-30 NOTE — CONSULTS
Pulmonary Initial Consultation Note    Name:Nia Caballero  MRN:[id number]  Date of Service: 9/30/17  Referring physician: Kadi Hart M.D.    Chief Complaint: Asthma exacerbation    History of Present Illness:  <HPHISTORY>   22 y F with Severe persistent asthma diagnosed 5 years ago, never been well controlled, who presents with 2 week history of progressively worsening dyspnea, primarily at night. States she would take her albuterol neb around 8pm, goes to bed, and awakens feeling dyspneic, wheezing, without improvement from inhalers/nebs prompting her to present to [facility]. Over the last year,7-8 urgent care visits, this is only hospitalization. She has had Prednisone bursts x 7 times with temporary benefit. She has gained 15 lbs this year alone due to prednisone. She is currently on Albuterol, advair, singulair, zyrtec. Denies any hoarseness of voice in AM, no metallic taste, no GERD symptoms. 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 Currently finishing up her last few courses which aren't too strenuous. She is from , had seen pulmonologist there along with ENT. Denies any issues with vocal cords, and pulm had difficulty controlling her asthma. No records are available to me here.  +1 cat at home- known allergies  Practices good hygiene in her room, however.  Frequent nocturnal symptoms, daytime usually good  Has more bad days than good  Never-smoker, no one smoking around her    Past Medical History:   has a past medical history of Seasonal allergies and Unspecified asthma.    Past Surgical History:   has a past surgical history that includes mole excision and sinuscopy.    Allergies:  Naproxen    Medications:  No current facility-administered medications on file prior to encounter.      Current Outpatient Prescriptions on File Prior to Encounter   Medication Sig Dispense Refill   • montelukast (SINGULAIR) 10 MG TABS Take 1 Tab by mouth every day. 30 Tab 1    • fluticasone-salmeterol (ADVAIR) 500-50 MCG/DOSE AEPB Inhale 1 Puff by mouth every 12 hours. 1 Inhaler 3   • fluticasone (FLONASE) 50 MCG/ACT nasal spray Spray 1 Spray in nose every day.     • cetirizine (ZYRTEC) 10 MG TABS Take 1 Tab by mouth every day. 30 Tab 0   • albuterol (PROVENTIL) 2.5 mg/0.5 mL NEBU 10 mg by Nebulization route 4 times a day as needed. 3 Bottle 3       Social History:   reports that she has never smoked. She has never used smokeless tobacco. She reports that she does not drink alcohol or use drugs.    Family History:  family history includes Cancer in her maternal grandmother; Diabetes in her maternal grandmother and mother; Hyperlipidemia in her father; Hypertension in her father and mother.    ROS:  As per ROS, rest of systems are negative per AMA and CMMS guidelines.    Physical Examination:  Physical Exam  Gen: Healthy appearing young lady in NAD. Resting comfortably in bed.   ENT: no polyps, mucosa moist  Head: NCAT  CVS: RRR, no mrg  PULM: CTAB, good air entry  ABD: NTND, +BS  EXT: no edema, no clubbing    Laboratories:  Recent Labs      09/29/17 0219   WBC  24.3*   RBC  4.34   HEMOGLOBIN  12.9   HEMATOCRIT  39.5   MCV  91.0   MCH  29.7   RDW  43.9   PLATELETCT  420   MPV  10.2   NEUTSPOLYS  85.40*   LYMPHOCYTES  8.20*   MONOCYTES  5.40   EOSINOPHILS  0.10   BASOPHILS  0.20     Recent Labs      09/29/17 0219   SODIUM  135   POTASSIUM  3.6   CHLORIDE  102   CO2  23   GLUCOSE  107*   BUN  10         Impression:  Active Hospital Problems    Diagnosis   • Asthma exacerbation [J45.901]     Priority: High   • Acute respiratory failure with hypoxia (CMS-HCC) [J96.01]     Priority: Medium   • Leukocytosis [D72.829]     Priority: Low       Plan:  Severe persistent asthma:  Continue current therapy  Can be discharged home on 40mg x 5 days followed by 5 day taper  symbicort instead of budesonide  Unable to get rid of cat as roommate's- repeat allergen testing, practice hygiene  No GERD  symptoms but can try a trial of PPI  Outpt ENT eval for vocal cords, pulmonary outpt visit  Had polypectomy 1 year ago, temporary relief, revisit?    Jesus Wilsk MD  [facility]

## 2017-09-30 NOTE — PROGRESS NOTES
Received bedside report from day RN and assumed care of patient at 1900. Patient is alert and oriented x4 with no signs of labored breathing or distress. Safety precautions in place including patient call light within reach, bed alarm n/a per risk assessment, personal possessions nearby, bed in low position and locked, hourly rounding in practice, and non-skid socks in place.

## 2017-09-30 NOTE — PROGRESS NOTES
Pt c/o increased SOB. Expiratory wheezes auscultated. RT called for treatment. Per RT approval, gave Albuterol inhaler per MAR. Pt on 1L  RR24 sitting up bed. RN monitoring at bedside until RT arrives.

## 2017-09-30 NOTE — DISCHARGE INSTRUCTIONS
Discharge Instructions    Discharged to home by car with friend. Discharged via wheelchair, hospital escort: Yes.  Special equipment needed: Not Applicable    Be sure to schedule a follow-up appointment with your primary care doctor or any specialists as instructed.     Discharge Plan:   Influenza Vaccine Indication: Indicated: 9 to 64 years of age  Influenza Vaccine Given - only chart on this line when given: Influenza Vaccine Given (See MAR)    I understand that a diet low in cholesterol, fat, and sodium is recommended for good health. Unless I have been given specific instructions below for another diet, I accept this instruction as my diet prescription.   Other diet: Regular, as tolerated    Special Instructions: None    · Is patient discharged on Warfarin / Coumadin?   No     · Is patient Post Blood Transfusion?  No    Depression / Suicide Risk    As you are discharged from this [facility] facility, it is important to learn how to keep safe from harming yourself.    Recognize the warning signs:  · Abrupt changes in personality, positive or negative- including increase in energy   · Giving away possessions  · Change in eating patterns- significant weight changes-  positive or negative  · Change in sleeping patterns- unable to sleep or sleeping all the time   · Unwillingness or inability to communicate  · Depression  · Unusual sadness, discouragement and loneliness  · Talk of wanting to die  · Neglect of personal appearance   · Rebelliousness- reckless behavior  · Withdrawal from people/activities they love  · Confusion- inability to concentrate     If you or a loved one observes any of these behaviors or has concerns about self-harm, here's what you can do:  · Talk about it- your feelings and reasons for harming yourself  · Remove any means that you might use to hurt yourself (examples: pills, rope, extension cords, firearm)  · Get professional help from the community (Mental Health, Substance Abuse,  psychological counseling)  · Do not be alone:Call your Safe Contact- someone whom you trust who will be there for you.  · Call your local CRISIS HOTLINE 240-0525 or 241-792-5576  · Call your local Children's Mobile Crisis Response Team [place] (743) 842-7118 or www.MedaPhor  · Call the toll free National Suicide Prevention Hotlines   · National Suicide Prevention Lifeline 301-831-VQJQ (2695)  · National Hope Line Network 800-SUICIDE (600-0752)

## 2017-10-02 ENCOUNTER — OFFICE VISIT (OUTPATIENT)
Dept: MEDICAL GROUP | Facility: MEDICAL CENTER | Age: 22
End: 2017-10-02
Payer: OTHER GOVERNMENT

## 2017-10-02 VITALS
WEIGHT: 226.8 LBS | RESPIRATION RATE: 18 BRPM | SYSTOLIC BLOOD PRESSURE: 112 MMHG | DIASTOLIC BLOOD PRESSURE: 70 MMHG | BODY MASS INDEX: 34.37 KG/M2 | HEIGHT: 68 IN | OXYGEN SATURATION: 95 % | HEART RATE: 100 BPM | TEMPERATURE: 97.4 F

## 2017-10-02 DIAGNOSIS — D72.829 LEUKOCYTOSIS, UNSPECIFIED TYPE: ICD-10-CM

## 2017-10-02 DIAGNOSIS — E66.9 OBESITY (BMI 30-39.9): ICD-10-CM

## 2017-10-02 DIAGNOSIS — J45.51 SEVERE PERSISTENT ASTHMA WITH ACUTE EXACERBATION: ICD-10-CM

## 2017-10-02 PROBLEM — J96.01 ACUTE RESPIRATORY FAILURE WITH HYPOXIA (HCC): Status: RESOLVED | Noted: 2017-09-28 | Resolved: 2017-10-02

## 2017-10-02 PROCEDURE — 99214 OFFICE O/P EST MOD 30 MIN: CPT | Performed by: PHYSICIAN ASSISTANT

## 2017-10-02 RX ORDER — PREDNISONE 20 MG/1
TABLET ORAL
Qty: 15 TAB | Refills: 0 | Status: SHIPPED | OUTPATIENT
Start: 2017-10-02 | End: 2017-11-19

## 2017-10-02 ASSESSMENT — PATIENT HEALTH QUESTIONNAIRE - PHQ9: CLINICAL INTERPRETATION OF PHQ2 SCORE: 0

## 2017-10-02 NOTE — PROGRESS NOTES
Subjective:   Nia Caballero is a 22 y.o. female here today for recent history of asthma exacerbation nighttime.    Asthma  This is a 23-year-old female who has had asthma since she was 17. Has associated allergies as well. Was recently seen at the ED for an exacerbation. Symptoms are only at nighttime. She is currently on Singulair 10 mg daily. Takes Zyrtec. Has a rescue inhaler at home. Has Advair. Has nebulizer treatment. She states that every time she goes in to be evaluated to give her 20 mg daily for 5 days of prednisone. Thus not effective. She is also provided a prescription for Symbicort but the medication is over $300. She is seen in the past an allergist and is requesting per discharge plan to see an ear nose and throat and possibly a referral to see a pulmonologist. She states symptoms are only at nighttime where she develops exacerbations. She tries to drink coffee. She takes her medications as needed.    Leukocytosis  In the hospital her white blood cell count was 24. She is received a couple injections of steroids. Also several oral courses. Denies any fevers. In the hospital the elevation was believed to be secondary from her steroid use.       Current medicines (including changes today)  Current Outpatient Prescriptions   Medication Sig Dispense Refill   • predniSONE (DELTASONE) 20 MG Tab Take three tablets daily x 5 days. 15 Tab 0   • fluticasone-salmeterol (ADVAIR) 500-50 MCG/DOSE AEROSOL POWDER, BREATH ACTIVATED Inhale 1 Puff by mouth every 12 hours. 1 Inhaler 5   • ipratropium-albuterol (DUONEB) 0.5-2.5 (3) MG/3ML nebulizer solution 3 mL by Nebulization route 4 times a day for 30 days. 360 mL 0   • acetaminophen (TYLENOL) 500 MG Tab Take 1,000 mg by mouth every 6 hours as needed for Mild Pain.     • fluticasone (FLONASE) 50 MCG/ACT nasal spray Spray 1 Spray in nose every day.     • cetirizine (ZYRTEC) 10 MG TABS Take 1 Tab by mouth every day. 30 Tab 0   • albuterol (PROVENTIL) 2.5 mg/0.5 mL  "NEBU 10 mg by Nebulization route 4 times a day as needed. 3 Bottle 3     No current facility-administered medications for this visit.      She  has a past medical history of Seasonal allergies and Unspecified asthma(493.90).    ROS   No chest pain, no shortness of breath, no abdominal pain and all other systems were reviewed and are negative.       Objective:     Blood pressure 112/70, pulse 100, temperature 36.3 °C (97.4 °F), resp. rate 18, height 1.727 m (5' 8\"), weight 102.9 kg (226 lb 12.8 oz), last menstrual period 09/05/2017, SpO2 95 %. Body mass index is 34.48 kg/m².   Physical Exam:  Constitutional: Alert, no distress.  Skin: Warm, dry, good turgor, no rashes in visible areas.  Eye: Equal, round and reactive, conjunctiva clear, lids normal.  ENMT: Lips without lesions, good dentition, oropharynx clear.  Neck: Trachea midline, no masses.   Lymph: No cervical or supraclavicular lymphadenopathy  Respiratory: Unlabored respiratory effort, lungs clear to auscultation, no wheezes, no ronchi.  Cardiovascular: Normal S1, S2, no murmur, no edema.  Abdomen: Soft, non-tender, no masses.  Psych: Alert and oriented x3, normal affect and mood.        Assessment and Plan:   The following treatment plan was discussed    1. Severe persistent asthma with acute exacerbation  Chronic condition with recent exacerbation. Referred to ENT and pulmonology. Provided a stronger dose of prednisone over the course of 5 days. Take 60 mg for 5 days. Renewed Advair Diskus as directed. Continue other medications. Contact me with any concerns.  - REFERRAL TO ENT  - REFERRAL TO PULMONOLOGY  - predniSONE (DELTASONE) 20 MG Tab; Take three tablets daily x 5 days.  Dispense: 15 Tab; Refill: 0  - fluticasone-salmeterol (ADVAIR) 500-50 MCG/DOSE AEROSOL POWDER, BREATH ACTIVATED; Inhale 1 Puff by mouth every 12 hours.  Dispense: 1 Inhaler; Refill: 5    2. Leukocytosis, unspecified type  New-onset condition likely secondary to steroids. Advised her in " follow-up we will repeat her CBC.    3. Obesity (BMI 30-39.9)  Chronic condition. Likely increase recently secondary to prednisone intake. We'll monitor.  - Patient identified as having weight management issue.  Appropriate orders and counseling given.      Followup: Return if symptoms worsen or fail to improve.    Please note that this dictation was created using voice recognition software. I have made every reasonable attempt to correct obvious errors, but I expect that there are errors of grammar and possibly content that I did not discover before finalizing the note.

## 2017-10-02 NOTE — ASSESSMENT & PLAN NOTE
In the hospital her white blood cell count was 24. She is received a couple injections of steroids. Also several oral courses. Denies any fevers. In the hospital the elevation was believed to be secondary from her steroid use.

## 2017-10-30 ENCOUNTER — OFFICE VISIT (OUTPATIENT)
Dept: URGENT CARE | Facility: CLINIC | Age: 22
End: 2017-10-30
Payer: OTHER GOVERNMENT

## 2017-10-30 VITALS
TEMPERATURE: 98.6 F | HEIGHT: 68 IN | DIASTOLIC BLOOD PRESSURE: 70 MMHG | HEART RATE: 112 BPM | OXYGEN SATURATION: 99 % | WEIGHT: 228.6 LBS | RESPIRATION RATE: 16 BRPM | SYSTOLIC BLOOD PRESSURE: 102 MMHG | BODY MASS INDEX: 34.65 KG/M2

## 2017-10-30 DIAGNOSIS — J03.90 EXUDATIVE TONSILLITIS: ICD-10-CM

## 2017-10-30 LAB
INT CON NEG: NEGATIVE
INT CON POS: POSITIVE
S PYO AG THROAT QL: NEGATIVE

## 2017-10-30 PROCEDURE — 99214 OFFICE O/P EST MOD 30 MIN: CPT | Performed by: FAMILY MEDICINE

## 2017-10-30 PROCEDURE — 87880 STREP A ASSAY W/OPTIC: CPT | Performed by: FAMILY MEDICINE

## 2017-10-30 RX ORDER — AMOXICILLIN AND CLAVULANATE POTASSIUM 875; 125 MG/1; MG/1
1 TABLET, FILM COATED ORAL 2 TIMES DAILY
Qty: 20 TAB | Refills: 0 | Status: SHIPPED | OUTPATIENT
Start: 2017-10-30 | End: 2017-11-09

## 2017-10-30 NOTE — PROGRESS NOTES
"Chief Complaint   Patient presents with   • Pharyngitis     x4days, sore throat, fever, cough               Pharyngitis   This is a new problem. The current episode started in the past 4days. The problem has been unchanged. There has been no fever. The pain is moderate. Associated symptoms include a cough, fever, swollen glands . Pertinent negatives include no abdominal pain,   diarrhea, headaches, shortness of breath or vomiting. no exposure to strep or mono.  Pt has tried acetaminophen for the symptoms. The treatment provided mild relief.       Social History   Substance Use Topics   • Smoking status: Never Smoker   • Smokeless tobacco: Never Used   • Alcohol use No          Past medical history was unremarkable and not pertinent to current issue    Review of Systems   Constitutional: Positive for malaise/fatigue. Negative for   weight loss.   HENT: Positive for ST , cough.    Respiratory: Negative for cough, sputum production and shortness of breath.    Cardiovascular: Negative for chest pain.   Gastrointestinal: Negative for nausea, vomiting, abdominal pain and diarrhea.   Genitourinary: Negative.    Neurological: Negative for dizziness and headaches.   All other systems reviewed and are negative.         Objective:   Blood pressure 102/70, pulse (!) 112, temperature 37 °C (98.6 °F), resp. rate 16, height 1.727 m (5' 8\"), weight 103.7 kg (228 lb 9.6 oz), SpO2 99 %.          Physical Exam   Constitutional:   oriented to person, place, and time.  appears well-developed and well-nourished. No distress.   HENT:   Head: Normocephalic and atraumatic.   Right Ear: External ear normal.   Left Ear: External ear normal.   Nose: Mucosal edema present. Right sinus exhibits no maxillary sinus tenderness and no frontal sinus tenderness. Left sinus exhibits no maxillary sinus tenderness and no frontal sinus tenderness.   Mouth/Throat:    There is posterior oropharyngeal erythema AND EXUDATE present. No posterior oropharyngeal " edema.   Tonsils 3+ bilaterally     Eyes: Conjunctivae and EOM are normal. Pupils are equal, round, and reactive to light. Right eye exhibits no discharge. Left eye exhibits no discharge. No scleral icterus.   Neck: Normal range of motion. Neck supple. No JVD present. No tracheal deviation present. No thyromegaly present.   Cardiovascular: Normal rate, regular rhythm, normal heart sounds and intact distal pulses.  Exam reveals no friction rub.    No murmur heard.  Pulmonary/Chest: Effort normal and breath sounds normal. No respiratory distress.   no wheezes.   no rales.    Musculoskeletal:  exhibits no edema.   Lymphadenopathy:     Positive Cervical adenopathy.   Neurological:   alert and oriented to person, place, and time.   Skin: Skin is warm and dry. No erythema.   Psychiatric:   normal mood and affect.   Nursing note and vitals reviewed.             Assessment/Plan:     1. Exudative tonsillitis     - POCT Rapid Strep A NEG  - amoxicillin-clavulanate (AUGMENTIN) 875-125 MG Tab; Take 1 Tab by mouth 2 times a day for 10 days.  Dispense: 20 Tab; Refill: 0    Follow up in one week if no improvement, sooner if symptoms worsen.

## 2017-11-19 ENCOUNTER — OFFICE VISIT (OUTPATIENT)
Dept: URGENT CARE | Facility: CLINIC | Age: 22
End: 2017-11-19
Payer: OTHER GOVERNMENT

## 2017-11-19 VITALS
HEIGHT: 68 IN | DIASTOLIC BLOOD PRESSURE: 74 MMHG | OXYGEN SATURATION: 90 % | RESPIRATION RATE: 14 BRPM | HEART RATE: 84 BPM | SYSTOLIC BLOOD PRESSURE: 112 MMHG | TEMPERATURE: 97.5 F | BODY MASS INDEX: 33.34 KG/M2 | WEIGHT: 220 LBS

## 2017-11-19 DIAGNOSIS — J45.51 SEVERE PERSISTENT ASTHMA WITH EXACERBATION: ICD-10-CM

## 2017-11-19 PROCEDURE — 99214 OFFICE O/P EST MOD 30 MIN: CPT | Performed by: NURSE PRACTITIONER

## 2017-11-19 RX ORDER — PREDNISONE 20 MG/1
20 TABLET ORAL 2 TIMES DAILY
Qty: 10 TAB | Refills: 0 | Status: SHIPPED | OUTPATIENT
Start: 2017-11-19 | End: 2017-11-24

## 2017-11-19 ASSESSMENT — ENCOUNTER SYMPTOMS
COUGH: 1
WEAKNESS: 0
SORE THROAT: 0
CHILLS: 0
SPUTUM PRODUCTION: 0
SHORTNESS OF BREATH: 1
WHEEZING: 1
HEMOPTYSIS: 0
PALPITATIONS: 0
DIAPHORESIS: 0
FEVER: 0
ORTHOPNEA: 0

## 2017-11-19 NOTE — PROGRESS NOTES
Subjective:      Nia Caballero is a 22 y.o. female who presents with Asthma (x las few days mostly in the evenings)            Patient comes in today with a 3 day history of increased chest tightness and dry cough.  She has baseline severe persistent asthma.  Her symptoms are worse at night or when supine.  She notes worse symptoms in [place], versus her home in [place].  She will be in [place] for 2-3 more weeks, finishing university courses.  She reports recent exacerbation requiring 3 day hospital stay in late September.  She states she has good response to oral prednisone, and has taken numerous courses this year.  She was on prednisone for almost the entire month of October.  Per patient account, she had a recent consult with pulmonology and bronchoscopy was recommended.  She has decided to follow up with her pulmonologist, allergist, and PCP in [place] as she will be moving back there within the month.  She typically takes Advair, Zytrec, Flonase nasal spray, and Montelukast daily for asthma with fair control.  She has albuterol inhaler and neb for rescue use and typically uses it 2-3 times per month.  For the past few days she has been using it up to 6 times a day, with some relief of acute symptoms.  Last albuterol use was 11 hours ago.  She declines in clinic nebulizer treatment.  She cortez snot routinely measure peak flow.  No fever, chills, nasal congestion, sore throat, or sputum production.        Review of Systems   Constitutional: Positive for malaise/fatigue. Negative for chills, diaphoresis and fever.   HENT: Negative for congestion, ear pain and sore throat.    Respiratory: Positive for cough, shortness of breath and wheezing. Negative for hemoptysis and sputum production.    Cardiovascular: Negative for chest pain, palpitations, orthopnea and leg swelling.   Skin: Negative for rash.   Neurological: Negative for weakness.     Medications, Allergies, and current problem list reviewed today in  "Epic     Objective:     /74   Pulse 84   Temp 36.4 °C (97.5 °F)   Resp 14   Ht 1.727 m (5' 8\")   Wt 99.8 kg (220 lb)   SpO2 90%   BMI 33.45 kg/m²      Physical Exam   Constitutional: She is oriented to person, place, and time. She appears well-developed and well-nourished. No distress.   HENT:   Head: Normocephalic.   Right Ear: External ear normal.   Left Ear: External ear normal.   Nose: Nose normal.   Mouth/Throat: Oropharynx is clear and moist. No oropharyngeal exudate.   Eyes: Conjunctivae are normal. Pupils are equal, round, and reactive to light. Right eye exhibits no discharge. Left eye exhibits no discharge. No scleral icterus.   Neck: Neck supple. No JVD present. No tracheal deviation present. No thyromegaly present.   Cardiovascular: Normal rate, regular rhythm and normal heart sounds.  Exam reveals no gallop and no friction rub.    No murmur heard.  Pulmonary/Chest: Effort normal. No stridor. No respiratory distress. She has wheezes. She has no rales. She exhibits no tenderness.   Occasional dry cough in clinic.  Scattered wheezing through upper fields bilaterally.   Musculoskeletal: She exhibits no edema.   Lymphadenopathy:     She has no cervical adenopathy.   Neurological: She is alert and oriented to person, place, and time.   Skin: Skin is warm and dry. No rash noted. She is not diaphoretic. No erythema.   Vitals reviewed.              Assessment/Plan:     1. Severe persistent asthma with exacerbation  - predniSONE (DELTASONE) 20 MG Tab; Take 1 Tab by mouth 2 times a day for 5 days.  Dispense: 10 Tab; Refill: 0    Discussed exam findings with patient.  Prednisone as prescribed.   Continue asthma medication protocol as previously prescribed.  Trial use of albuterol every 6 hours for 3 days, plus additional use prn.    Maintain adequate po hydration.  Strong ED and 911 precautions discussed.  Patient declined further asthma care referrals, stating she will follow up with PCP her and then " prn in [place].  Patient verbalized understanding of and agreed with plan of care.

## 2017-11-28 ENCOUNTER — OFFICE VISIT (OUTPATIENT)
Dept: URGENT CARE | Facility: CLINIC | Age: 22
End: 2017-11-28
Payer: OTHER GOVERNMENT

## 2017-11-28 VITALS
TEMPERATURE: 97.9 F | HEIGHT: 68 IN | SYSTOLIC BLOOD PRESSURE: 120 MMHG | WEIGHT: 229 LBS | OXYGEN SATURATION: 95 % | HEART RATE: 109 BPM | BODY MASS INDEX: 34.71 KG/M2 | DIASTOLIC BLOOD PRESSURE: 78 MMHG

## 2017-11-28 DIAGNOSIS — R05.9 COUGH: ICD-10-CM

## 2017-11-28 DIAGNOSIS — J98.01 BRONCHOSPASM: ICD-10-CM

## 2017-11-28 DIAGNOSIS — J45.21 MILD INTERMITTENT ASTHMA WITH EXACERBATION: ICD-10-CM

## 2017-11-28 DIAGNOSIS — J30.2 SEASONAL ALLERGIC RHINITIS, UNSPECIFIED CHRONICITY, UNSPECIFIED TRIGGER: ICD-10-CM

## 2017-11-28 PROCEDURE — 99214 OFFICE O/P EST MOD 30 MIN: CPT | Performed by: PHYSICIAN ASSISTANT

## 2017-11-28 RX ORDER — MONTELUKAST SODIUM 10 MG/1
10 TABLET ORAL DAILY
COMMUNITY

## 2017-11-28 RX ORDER — PROMETHAZINE HYDROCHLORIDE AND CODEINE PHOSPHATE 6.25; 1 MG/5ML; MG/5ML
5 SYRUP ORAL EVERY 12 HOURS PRN
Qty: 180 ML | Refills: 0 | Status: SHIPPED | OUTPATIENT
Start: 2017-11-28

## 2017-11-28 ASSESSMENT — ENCOUNTER SYMPTOMS
SORE THROAT: 0
DIARRHEA: 0
SHORTNESS OF BREATH: 0
COUGH: 1
CHILLS: 0
EYE DISCHARGE: 0
MYALGIAS: 0
WHEEZING: 1
ABDOMINAL PAIN: 0
EYE REDNESS: 0
NAUSEA: 0
HEMOPTYSIS: 0
SPUTUM PRODUCTION: 0
VOMITING: 0
FEVER: 0

## 2017-11-28 NOTE — PROGRESS NOTES
Subjective:      Nia Caballero is a 22 y.o. female who presents with Cough (congestion,SOB x1week in a half )      Cough   Associated symptoms include wheezing. Pertinent negatives include no chills, ear pain, eye redness, fever, hemoptysis, myalgias, rash, sore throat or shortness of breath. Her past medical history is significant for environmental allergies.   pt notes long recent PMH of seasonal allerg - notes consistent cough nightly - uses claritin/advair/flonase/singulair - nebulizer/MDI albuterol, has seen ENT and has had sinus polyp surgery. Has seen ENT. Denies fever/chills/nausea/vomtiign. Does not feel sick now. Just w/ wheezy night time cough. Notes has been on chronic steroids for last 6-8mos.  And mucinex. Is travellling home in 2wks, just needs to make it home. We discuss her need to minimize continued use of PO steroids. She feels they are no longer working.     Review of Systems   Constitutional: Negative for chills, fever and malaise/fatigue.   HENT: Positive for congestion. Negative for ear pain and sore throat.    Eyes: Negative for discharge and redness.   Respiratory: Positive for cough and wheezing. Negative for hemoptysis, sputum production and shortness of breath.    Gastrointestinal: Negative for abdominal pain, diarrhea, nausea and vomiting.   Musculoskeletal: Negative for myalgias.   Skin: Negative for rash.   Endo/Heme/Allergies: Positive for environmental allergies.     PMH:  has a past medical history of Seasonal allergies and Unspecified asthma(493.90).  MEDS:   Current Outpatient Prescriptions:   •  montelukast (SINGULAIR) 10 MG Tab, Take 10 mg by mouth every day., Disp: , Rfl:   •  cetirizine (ZYRTEC) 10 MG TABS, Take 1 Tab by mouth every day., Disp: 30 Tab, Rfl: 0  •  fluticasone-salmeterol (ADVAIR) 500-50 MCG/DOSE AEROSOL POWDER, BREATH ACTIVATED, Inhale 1 Puff by mouth every 12 hours., Disp: 1 Inhaler, Rfl: 5  •  acetaminophen (TYLENOL) 500 MG Tab, Take 1,000 mg by mouth  "every 6 hours as needed for Mild Pain., Disp: , Rfl:   •  fluticasone (FLONASE) 50 MCG/ACT nasal spray, Spray 1 Spray in nose every day., Disp: , Rfl:   •  albuterol (PROVENTIL) 2.5 mg/0.5 mL NEBU, 10 mg by Nebulization route 4 times a day as needed., Disp: 3 Bottle, Rfl: 3  ALLERGIES:   Allergies   Allergen Reactions   • Naproxen      SURGHX:   Past Surgical History:   Procedure Laterality Date   • MOLE EXCISION      Birth betsy removal   • SINUSCOPY       SOCHX:  reports that she has never smoked. She has never used smokeless tobacco. She reports that she does not drink alcohol or use drugs.  FH: Family history was reviewed, no pertinent findings to report    I have worn a mask for the entire encounter with this patient.        Objective:     /78   Pulse (!) 109   Temp 36.6 °C (97.9 °F)   Ht 1.727 m (5' 8\")   Wt 103.9 kg (229 lb)   SpO2 95%   BMI 34.82 kg/m²      Physical Exam   Constitutional: She is oriented to person, place, and time. She appears well-developed and well-nourished. No distress.   HENT:   Head: Normocephalic and atraumatic.   Right Ear: Tympanic membrane, external ear and ear canal normal.   Left Ear: Tympanic membrane, external ear and ear canal normal.   Nose: Nose normal.   Mouth/Throat: Uvula is midline and mucous membranes are normal. Posterior oropharyngeal erythema ( mild PND) present. No oropharyngeal exudate, posterior oropharyngeal edema or tonsillar abscesses.   Eyes: Conjunctivae and lids are normal. Right eye exhibits no discharge. Left eye exhibits no discharge. No scleral icterus.   Neck: Neck supple.   Pulmonary/Chest: Effort normal and breath sounds normal. No respiratory distress. She has no decreased breath sounds. She has no wheezes (trace dry sounding, no wheeze). She has no rhonchi. She has no rales.   Musculoskeletal: Normal range of motion.   Lymphadenopathy:     She has cervical adenopathy.   Neurological: She is alert and oriented to person, place, and time. " She is not disoriented.   Skin: Skin is warm and dry. She is not diaphoretic. No erythema. No pallor.   Psychiatric: Her speech is normal and behavior is normal.   Nursing note and vitals reviewed.       Has plans to see pcp/pulm/allergist upon returning to [place] in 2wks.      Assessment/Plan:     1. Cough  Supportive care is reviewed with patient/caregiver - recommend to push PO fluids and electrolytes, Nsaids/tylenol, netti pot/saline irrig, continue all MDI/neb and additional allerg/ breathing tx's - will hold off on additional PO steroids, Cautioned regarding potential for sedation with medication.  Return to clinic with lack of resolution or progression of symptoms.  - promethazine-codeine (PHENERGAN-CODEINE) 6.25-10 MG/5ML Syrup; Take 5 mL by mouth every 12 hours as needed.  Dispense: 180 mL; Refill: 0    2. Seasonal allergic rhinitis, unspecified chronicity, unspecified trigger      3. Bronchospasm      4. Mild intermittent asthma with exacerbation

## 2020-11-16 NOTE — ASSESSMENT & PLAN NOTE
This is a 23-year-old female who has had asthma since she was 17. Has associated allergies as well. Was recently seen at the ED for an exacerbation. Symptoms are only at nighttime. She is currently on Singulair 10 mg daily. Takes Zyrtec. Has a rescue inhaler at home. Has Advair. Has nebulizer treatment. She states that every time she goes in to be evaluated to give her 20 mg daily for 5 days of prednisone. Thus not effective. She is also provided a prescription for Symbicort but the medication is over $300. She is seen in the past an allergist and is requesting per discharge plan to see an ear nose and throat and possibly a referral to see a pulmonologist. She states symptoms are only at nighttime where she develops exacerbations. She tries to drink coffee. She takes her medications as needed.   Statement Selected